# Patient Record
Sex: FEMALE | Race: WHITE
[De-identification: names, ages, dates, MRNs, and addresses within clinical notes are randomized per-mention and may not be internally consistent; named-entity substitution may affect disease eponyms.]

---

## 2017-07-04 ENCOUNTER — HOSPITAL ENCOUNTER (EMERGENCY)
Dept: HOSPITAL 56 - MW.ED | Age: 22
Discharge: HOME | End: 2017-07-04
Payer: COMMERCIAL

## 2017-07-04 VITALS — DIASTOLIC BLOOD PRESSURE: 66 MMHG | SYSTOLIC BLOOD PRESSURE: 117 MMHG

## 2017-07-04 DIAGNOSIS — F41.0: Primary | ICD-10-CM

## 2017-07-04 DIAGNOSIS — Z98.890: ICD-10-CM

## 2017-07-04 PROCEDURE — 99283 EMERGENCY DEPT VISIT LOW MDM: CPT

## 2017-07-04 NOTE — EDM.PDOC
ED HPI GENERAL MEDICAL PROBLEM





- General


Chief Complaint: Behavioral/Psych


Stated Complaint: PANIC ATTACK


Time Seen by Provider: 07/04/17 16:00


Source of Information: Reports: Patient


History Limitations: Reports: No Limitations





- History of Present Illness


INITIAL COMMENTS - FREE TEXT/NARRATIVE: 


History of present illness:


[22-year-old female presenting with complaints of anxiety. Patient has had a low

-grade anxiety problem she recalls she has struggled with since childhood. 

Patient had some incidents in the last month it has exacerbated it and now she 

is having karmen panic attacks.]





Review of systems: 


As per history of present illness and below otherwise all systems reviewed and 

negative.





Past medical history: 


As per history of present illness and as reviewed below otherwise 

noncontributory.





Surgical history: 


As per history of present illness and as reviewed below otherwise 

noncontributory.





Social history: 


No reported history of drug or alcohol abuse.





Family history: 


As per history of present illness and as reviewed below otherwise 

noncontributory.





Physical exam:


HEENT: Atraumatic, normocephalic, pupils reactive, negative for conjunctival 

pallor or scleral icterus, mucous membranes moist, throat clear, neck supple, 

nontender, trachea midline.


Lungs: Clear to auscultation, breath sounds equal bilaterally, chest nontender.


Heart: S1S2, regular, negative for clicks, rubs, or JVD.


Abdomen: Soft, nondistended, nontender. Negative for masses or 

hepatosplenomegaly. Negative for costovertebral tenderness.


Pelvis: Stable nontender.


Genitourinary: Deferred.


Rectal: Deferred.


Extremities: Atraumatic, negative for cords or calf pain. Neurovascular 

unremarkable.


Neuro: Awake, alert, oriented. Cranial nerves II through XII unremarkable. 

Cerebellum unremarkable. Motor and sensory unremarkable throughout. Exam 

nonfocal.





Global assessment is benign save the subjective complaint as noted in the 

history of present illness





Diagnostics:


[]





Therapeutics:


[]





Impression: 


[Anxiety disorder]





Plan:


[xanax for 5 days]





Definitive disposition and diagnosis as appropriate pending reevaluation and 

review of above.











- Related Data


 Allergies











Allergy/AdvReac Type Severity Reaction Status Date / Time


 


No Known Allergies Allergy   Verified 07/04/17 15:52











Home Meds: 


 Home Meds





LORazepam [Ativan] 0.5 mg PO ASDIRECTED PRN 07/04/17 [History]











Past Medical History





- Past Health History


Medical/Surgical History: Denies Medical/Surgical History





- Past Surgical History


Other HEENT Surgeries/Procedures: addenoidectomy





Social & Family History





- Tobacco Use


Smoking Status *Q: Never Smoker


Second Hand Smoke Exposure: No





- Alcohol Use


Days Per Week of Alcohol Use: 3


Number of Drinks Per Day: 3


Total Drinks Per Week: 9





- Recreational Drug Use


Recreational Drug Use: No





ED ROS GENERAL





- Review of Systems


Review Of Systems: See Below (History of present illness)





ED EXAM, BEHAVIORAL HEALTH





- Physical Exam


Exam: See Below (See history of present illness)





COURSE, BEHAVIORAL HEALTH COMP





- Course


Vital Signs: 





 Last Vital Signs











Temp  36.8 C   07/04/17 15:53


 


Pulse  102 H  07/04/17 15:53


 


Resp  20   07/04/17 15:53


 


BP  131/92 H  07/04/17 15:53


 


Pulse Ox  99   07/04/17 15:53











Orders, Labs, Meds: 





 Active Orders 24 hr











 Category Date Time Status


 


 ALPRAZolam [Xanax] Med  07/04/17 16:37 Once





 0.5 mg PO NOW ONE   














Departure





- Departure


Time of Disposition: 17:36


Disposition: Home, Self-Care 01


Condition: Good


Clinical Impression: 


 Panic disorder, Anxiety








- Discharge Information


Forms:  ED Department Discharge


Additional Instructions: 


The following information is given to patients seen in the emergency department 

who are being discharged to home. This information is to outline your options 

for follow-up care. We provide all patients seen in our emergency department 

with a follow-up referral.





The need for follow-up, as well as the timing and circumstances, are variable 

depending upon the specifics of your emergency department visit.





If you don't have a primary care physician on staff, we will provide you with a 

referral. We always advise you to contact your personal physician following an 

emergency department visit to inform them of the circumstance of the visit and 

for follow-up with them and/or the need for any referrals to a consulting 

specialist.





The emergency department will also refer you to a specialist when appropriate. 

This referral assures that you have the opportunity for follow-up care with a 

specialist. All of these measure are taken in an effort to provide you with 

optimal care, which includes your follow-up.





Under all circumstances we always encourage you to contact your private 

physician who remains a resource for coordinating your care. When calling for 

follow-up care, please make the office aware that this follow-up is from your 

recent emergency room visit. If for any reason you are refused follow-up, 

please contact the [facility] Emergency 

Department at [phone] and asked to speak to the emergency department 

charge nurse.





After a lengthy discussion about your underlying anxiety disorder, OCD and 

panic attacks I highly encourage you to follow-up with [place] [facility] as 

well as a primary care provider for prescriptions for depression/anxiety 

medication as discussed








Take all anxiety medicine sparingly as they can have a highly addictive factor








Return to ED as needed as discussed





- My Orders


Last 24 Hours: 





My Active Orders





07/04/17 16:37


ALPRAZolam [Xanax]   0.5 mg PO NOW ONE 














- Assessment/Plan


Last 24 Hours: 





My Active Orders





07/04/17 16:37


ALPRAZolam [Xanax]   0.5 mg PO NOW ONE

## 2017-07-25 ENCOUNTER — HOSPITAL ENCOUNTER (EMERGENCY)
Dept: HOSPITAL 56 - MW.ED | Age: 22
Discharge: HOME | End: 2017-07-25
Payer: COMMERCIAL

## 2017-07-25 VITALS — SYSTOLIC BLOOD PRESSURE: 130 MMHG | DIASTOLIC BLOOD PRESSURE: 73 MMHG

## 2017-07-25 DIAGNOSIS — R11.2: ICD-10-CM

## 2017-07-25 DIAGNOSIS — F41.9: Primary | ICD-10-CM

## 2017-07-25 DIAGNOSIS — Z98.890: ICD-10-CM

## 2017-07-25 DIAGNOSIS — Z79.899: ICD-10-CM

## 2017-07-25 LAB
CHLORIDE SERPL-SCNC: 113 MMOL/L (ref 98–110)
SODIUM SERPL-SCNC: 145 MMOL/L (ref 136–146)

## 2017-07-25 PROCEDURE — 96361 HYDRATE IV INFUSION ADD-ON: CPT

## 2017-07-25 PROCEDURE — 82150 ASSAY OF AMYLASE: CPT

## 2017-07-25 PROCEDURE — 85025 COMPLETE CBC W/AUTO DIFF WBC: CPT

## 2017-07-25 PROCEDURE — 80053 COMPREHEN METABOLIC PANEL: CPT

## 2017-07-25 PROCEDURE — 81025 URINE PREGNANCY TEST: CPT

## 2017-07-25 PROCEDURE — 80305 DRUG TEST PRSMV DIR OPT OBS: CPT

## 2017-07-25 PROCEDURE — 96375 TX/PRO/DX INJ NEW DRUG ADDON: CPT

## 2017-07-25 PROCEDURE — 81001 URINALYSIS AUTO W/SCOPE: CPT

## 2017-07-25 PROCEDURE — 36415 COLL VENOUS BLD VENIPUNCTURE: CPT

## 2017-07-25 PROCEDURE — 83690 ASSAY OF LIPASE: CPT

## 2017-07-25 PROCEDURE — 99284 EMERGENCY DEPT VISIT MOD MDM: CPT

## 2017-07-25 PROCEDURE — 93005 ELECTROCARDIOGRAM TRACING: CPT

## 2017-07-25 PROCEDURE — 96374 THER/PROPH/DIAG INJ IV PUSH: CPT

## 2017-07-25 NOTE — EDM.PDOC
ED HPI GENERAL MEDICAL PROBLEM





- General


Chief Complaint: General


Stated Complaint: CONSTANT VOMITING; ON SEVERAL MEDICATIONS


Time Seen by Provider: 07/25/17 01:53





- History of Present Illness


INITIAL COMMENTS - FREE TEXT/NARRATIVE: 





HISTORY AND PHYSICAL:





History of present illness:


The patient is a 22-year-old female who follows in our behavioral health clinic 

and has a long-standing history of anxiety and presents with a panic attack 

that started at approximately 11:00 in the morning and has been ongoing 

throughout the day and evening. The patient says she thinks that her panic 

attack was triggered by drinking some coffee and she tried medication she has 

at home but they did not work. She has been vomiting intermittently throughout 

the day and is now only dry heaving. She has abdominal pain and lower chest 

discomfort as a result of all the vomiting but he did not start before the 

vomiting. She denies pregnancy and has no discrete chest pain but feels short 

of breath and states she knows she is hyperventilating. Patient says that she 

has an appointment in the clinic on Wednesday to address this heightened 

anxiety but is here for treatment this evening.





Review of systems: 


As per history of present illness and below otherwise all systems reviewed and 

negative.





Past medical history: 


As per history of present illness and as reviewed below otherwise 

noncontributory.





Surgical history: 


As per history of present illness and as reviewed below otherwise 

noncontributory.





Social history: 


No reported history of drug or alcohol abuse.





Family history: 


As per history of present illness and as reviewed below otherwise 

noncontributory.





Physical exam:


Gen.: Well-nourished well-developed female who is anxious in the room 

hyperventilating and dry heaving on my evaluation. Hard for her to sit still in 

the bed for evaluation.


HEENT: Atraumatic, normocephalic, pupils reactive, negative for conjunctival 

pallor or scleral icterus, mucous membranes moist, throat clear, neck supple, 

nontender, trachea midline.


Lungs: Clear to auscultation, breath sounds equal bilaterally, chest nontender.


Heart: S1S2, regular rate and rhythm no overt murmurs


Abdomen: Soft, nondistended, nontender. NABS Negative for costovertebral 

tenderness.


Skin: No evidence of any rashes or lesions are seen grossly and turgor is 

normal there is no diaphoresis


Genitourinary: Deferred.


Rectal: Deferred.


Extremities: Atraumatic, negative for cords or calf pain. Neurovascular 

unremarkable.


Neuro: Awake, alert, oriented. Cranial nerves II through XII unremarkable. 

Cerebellum unremarkable. Motor and sensory unremarkable throughout. Exam 

nonfocal.





Diagnostics:


CBC CMP amylase lipase UA UCG EKG





Therapeutics:


IV fluids Pepcid Zofran Ativan





After  medications the patient is resting comfortable in the ED and is not 

having any dry heaves or vomiting and has been taking some ice chips. Patient 

is significantly calmer and not hyperventilating or rolling about the bed.





Patient and friend at bedside are aware of all testing results and care plan 

for discharge home. Advised to avoid caffeinated products and to keep her 

appointment in the clinic on Wednesday.


Impression: 


Anxiety/panic attack with vomiting





Definitive disposition and diagnosis as appropriate pending reevaluation and 

review of above.


  ** generalized


Pain Score (Numeric/FACES): 10





- Related Data


 Allergies











Allergy/AdvReac Type Severity Reaction Status Date / Time


 


No Known Allergies Allergy   Verified 07/25/17 01:49











Home Meds: 


 Home Meds





ALPRAZolam [Xanax] 2 mg PO 07/25/17 [History]


LORazepam 0.5 mg PO 07/25/17 [History]


Sertraline [Zoloft] 50 mg PO DAILY 07/25/17 [History]











Past Medical History





- Past Health History


Medical/Surgical History: Denies Medical/Surgical History


Cardiovascular History: Reports: None


Respiratory History: Reports: None


Gastrointestinal History: Reports: None


Genitourinary History: Reports: None


OB/GYN History: Reports: None


Musculoskeletal History: Reports: None


Neurological History: Reports: None


Psychiatric History: Reports: Anxiety, PTSD, Other (See Below)


Other Psychiatric History: conversion disorder


Endocrine/Metabolic History: Reports: None


Hematologic History: Reports: None





- Infectious Disease History


Infectious Disease History: Reports: None





- Past Surgical History


Other HEENT Surgeries/Procedures: addenoidectomy


Female  Surgical History: Reports: None





Social & Family History





- Family History


Family Medical History: Noncontributory





- Tobacco Use


Smoking Status *Q: Never Smoker


Second Hand Smoke Exposure: No





- Alcohol Use


Days Per Week of Alcohol Use: 3


Number of Drinks Per Day: 3


Total Drinks Per Week: 9





- Recreational Drug Use


Recreational Drug Use: No





ED ROS GENERAL





- Review of Systems


Review Of Systems: ROS reveals no pertinent complaints other than HPI.





ED EXAM, GENERAL





- Physical Exam


Exam: See Below (See dictation)





Course





- Vital Signs


Last Recorded V/S: 


 Last Vital Signs











Temp  36.6 C   07/25/17 01:50


 


Pulse  88   07/25/17 01:50


 


Resp  30 H  07/25/17 01:50


 


BP  130/73   07/25/17 01:50


 


Pulse Ox  100   07/25/17 01:50














- Orders/Labs/Meds


Orders: 


 Active Orders 24 hr











 Category Date Time Status


 


 EKG Documentation Completion [RC] STAT Care  07/25/17 02:12 Active


 


 Sodium Chloride 0.9% [Normal Saline] 1,000 ml Med  07/25/17 03:25 Active





 IV .Bolus   


 


 Sodium Chloride 0.9% [Saline Flush] Med  07/25/17 02:09 Active





 10 ml FLUSH ASDIRECTED PRN   


 


 Sodium Chloride 0.9% [Saline Flush] Med  07/25/17 02:09 Active





 2.5 ml FLUSH ASDIRECTED PRN   


 


 Saline Lock Insert [OM.PC] Stat Oth  07/25/17 02:09 Ordered








 Medication Orders





Sodium Chloride (Normal Saline)  1,000 mls @ 999 mls/hr IV .Bolus ONE


   Stop: 07/25/17 04:25


   Last Admin: 07/25/17 03:28  Dose: 999 mls/hr


Sodium Chloride (Saline Flush)  10 ml FLUSH ASDIRECTED PRN


   PRN Reason: Keep Vein Open


   Last Admin: 07/25/17 03:02  Dose: 10 ml


Sodium Chloride (Saline Flush)  2.5 ml FLUSH ASDIRECTED PRN


   PRN Reason: Keep Vein Open


   Last Admin: 07/25/17 03:02  Dose: 2.5 ml








Labs: 


 Laboratory Tests











  07/25/17 07/25/17 07/25/17 Range/Units





  03:15 03:15 03:35 


 


WBC  6.88    (4.0-11.0)  K/uL


 


RBC  4.23 L    (4.30-5.90)  M/uL


 


Hgb  12.8    (12.0-16.0)  g/dL


 


Hct  36.8    (36.0-46.0)  %


 


MCV  87.0    (80.0-98.0)  fL


 


MCH  30.3    (27.0-32.0)  pg


 


MCHC  34.8    (31.0-37.0)  g/dL


 


RDW Std Deviation  48.2    (28.0-62.0)  fl


 


RDW Coeff of Gali  15    (11.0-15.0)  %


 


Plt Count  252    (150-400)  K/uL


 


MPV  9.40    (7.40-12.00)  fL


 


Neut % (Auto)  88.3 H    (48.0-80.0)  %


 


Lymph % (Auto)  9.4 L    (16.0-40.0)  %


 


Mono % (Auto)  2.3    (0.0-15.0)  %


 


Eos % (Auto)  0.0    (0.0-7.0)  %


 


Baso % (Auto)  0.0    (0.0-1.5)  %


 


Neut # (Auto)  6.1 H    (1.4-5.7)  K/uL


 


Lymph # (Auto)  0.7    (0.6-2.4)  K/uL


 


Mono # (Auto)  0.2    (0.0-0.8)  K/uL


 


Eos # (Auto)  0.0    (0.0-0.7)  K/uL


 


Baso # (Auto)  0.0    (0.0-0.1)  K/uL


 


Nucleated RBC %  0.0    /100WBC


 


Nucleated RBCs #  0    K/uL


 


Sodium   145   (136-146)  mmol/L


 


Potassium   3.3 L   (3.5-5.1)  mmol/L


 


Chloride   113 H   ()  mmol/L


 


Carbon Dioxide   21   (21-31)  mmol/L


 


BUN   12   (6.0-23.0)  mg/dL


 


Creatinine   0.8   (0.6-1.5)  mg/dL


 


Est Cr Clr Drug Dosing   96.06   mL/min


 


Estimated GFR (MDRD)   > 60.0   ml/min


 


Glucose   146 H   ()  mg/dL


 


Calcium   8.4 L   (8.8-10.8)  mg/dL


 


Total Bilirubin   0.3   (0.1-1.5)  mg/dL


 


AST   19   (5-40)  IU/L


 


ALT   16   (8-54)  IU/L


 


Alkaline Phosphatase   44   ()  


 


Total Protein   6.5   (6.0-8.0)  g/dL


 


Albumin   4.0   (3.5-5.0)  g/dL


 


Globulin   2.5   (2.0-3.5)  g/dL


 


Albumin/Globulin Ratio   1.6   (1.3-2.8)  


 


Amylase   47   (10-90)  U/L


 


Lipase   < 8   (7-80)  U/L


 


Urine Color     


 


Urine Appearance     


 


Urine pH     (5.0-8.0)  


 


Ur Specific Gravity     (1.001-1.035)  


 


Urine Protein     (NEGATIVE)  mg/dL


 


Urine Glucose (UA)     (NEGATIVE)  mg/dL


 


Urine Ketones     (NEGATIVE)  mg/dL


 


Urine Occult Blood     (NEGATIVE)  


 


Urine Nitrite     (NEGATIVE)  


 


Urine Bilirubin     (NEGATIVE)  


 


Urine Ictotest     


 


Urine Urobilinogen     (<2.0)  EU/dL


 


Ur Leukocyte Esterase     (NEGATIVE)  


 


Urine RBC     (0-2/HPF)  


 


Urine WBC     (0-5/HPF)  


 


Ur Epithelial Cells     (NONE-FEW)  


 


Urine Bacteria     (NEGATIVE)  


 


Urine Mucus     (NONE-MOD)  


 


Urine HCG, Qual    NEGATIVE  (NEGATIVE)  


 


Urine Opiates Screen     (NEGATIVE)  


 


Ur Oxycodone Screen     (NEGATIVE)  


 


Urine Methadone Screen     (NEGATIVE)  


 


Ur Barbiturates Screen     (NEGATIVE)  


 


Ur Phencyclidine Scrn     (NEGATIVE)  


 


Ur Amphetamine Screen     (NEGATIVE)  


 


U Methamphetamines Scrn     (NEGATIVE)  


 


U Benzodiazepines Scrn     (NEGATIVE)  


 


U Cocaine Metab Screen     (NEGATIVE)  


 


U Marijuana (THC) Screen     (NEGATIVE)  














  07/25/17 07/25/17 Range/Units





  03:35 03:35 


 


WBC    (4.0-11.0)  K/uL


 


RBC    (4.30-5.90)  M/uL


 


Hgb    (12.0-16.0)  g/dL


 


Hct    (36.0-46.0)  %


 


MCV    (80.0-98.0)  fL


 


MCH    (27.0-32.0)  pg


 


MCHC    (31.0-37.0)  g/dL


 


RDW Std Deviation    (28.0-62.0)  fl


 


RDW Coeff of Gali    (11.0-15.0)  %


 


Plt Count    (150-400)  K/uL


 


MPV    (7.40-12.00)  fL


 


Neut % (Auto)    (48.0-80.0)  %


 


Lymph % (Auto)    (16.0-40.0)  %


 


Mono % (Auto)    (0.0-15.0)  %


 


Eos % (Auto)    (0.0-7.0)  %


 


Baso % (Auto)    (0.0-1.5)  %


 


Neut # (Auto)    (1.4-5.7)  K/uL


 


Lymph # (Auto)    (0.6-2.4)  K/uL


 


Mono # (Auto)    (0.0-0.8)  K/uL


 


Eos # (Auto)    (0.0-0.7)  K/uL


 


Baso # (Auto)    (0.0-0.1)  K/uL


 


Nucleated RBC %    /100WBC


 


Nucleated RBCs #    K/uL


 


Sodium    (136-146)  mmol/L


 


Potassium    (3.5-5.1)  mmol/L


 


Chloride    ()  mmol/L


 


Carbon Dioxide    (21-31)  mmol/L


 


BUN    (6.0-23.0)  mg/dL


 


Creatinine    (0.6-1.5)  mg/dL


 


Est Cr Clr Drug Dosing    mL/min


 


Estimated GFR (MDRD)    ml/min


 


Glucose    ()  mg/dL


 


Calcium    (8.8-10.8)  mg/dL


 


Total Bilirubin    (0.1-1.5)  mg/dL


 


AST    (5-40)  IU/L


 


ALT    (8-54)  IU/L


 


Alkaline Phosphatase    ()  


 


Total Protein    (6.0-8.0)  g/dL


 


Albumin    (3.5-5.0)  g/dL


 


Globulin    (2.0-3.5)  g/dL


 


Albumin/Globulin Ratio    (1.3-2.8)  


 


Amylase    (10-90)  U/L


 


Lipase    (7-80)  U/L


 


Urine Color  YELLOW   


 


Urine Appearance  SLT CLOUDY   


 


Urine pH  7.0   (5.0-8.0)  


 


Ur Specific Gravity  1.020   (1.001-1.035)  


 


Urine Protein  30   (NEGATIVE)  mg/dL


 


Urine Glucose (UA)  NEGATIVE   (NEGATIVE)  mg/dL


 


Urine Ketones  40 H   (NEGATIVE)  mg/dL


 


Urine Occult Blood  NEGATIVE   (NEGATIVE)  


 


Urine Nitrite  NEGATIVE   (NEGATIVE)  


 


Urine Bilirubin  SMALL H   (NEGATIVE)  


 


Urine Ictotest  NEGATIVE   


 


Urine Urobilinogen  0.2   (<2.0)  EU/dL


 


Ur Leukocyte Esterase  NEGATIVE   (NEGATIVE)  


 


Urine RBC  0-1   (0-2/HPF)  


 


Urine WBC  1-2   (0-5/HPF)  


 


Ur Epithelial Cells  MODERATE   (NONE-FEW)  


 


Urine Bacteria  FEW   (NEGATIVE)  


 


Urine Mucus  LIGHT   (NONE-MOD)  


 


Urine HCG, Qual    (NEGATIVE)  


 


Urine Opiates Screen   NEGATIVE  (NEGATIVE)  


 


Ur Oxycodone Screen   NEGATIVE  (NEGATIVE)  


 


Urine Methadone Screen   NEGATIVE  (NEGATIVE)  


 


Ur Barbiturates Screen   NEGATIVE  (NEGATIVE)  


 


Ur Phencyclidine Scrn   NEGATIVE  (NEGATIVE)  


 


Ur Amphetamine Screen   NEGATIVE  (NEGATIVE)  


 


U Methamphetamines Scrn   NEGATIVE  (NEGATIVE)  


 


U Benzodiazepines Scrn   POSITIVE  (NEGATIVE)  


 


U Cocaine Metab Screen   NEGATIVE  (NEGATIVE)  


 


U Marijuana (THC) Screen   POSITIVE  (NEGATIVE)  











Meds: 


Medications











Generic Name Dose Route Start Last Admin





  Trade Name Freq  PRN Reason Stop Dose Admin


 


Sodium Chloride  1,000 mls @ 999 mls/hr  07/25/17 03:25  07/25/17 03:28





  Normal Saline  IV  07/25/17 04:25  999 mls/hr





  .Bolus ONE   Administration


 


Sodium Chloride  10 ml  07/25/17 02:09  07/25/17 03:02





  Saline Flush  FLUSH   10 ml





  ASDIRECTED PRN   Administration





  Keep Vein Open   


 


Sodium Chloride  2.5 ml  07/25/17 02:09  07/25/17 03:02





  Saline Flush  FLUSH   2.5 ml





  ASDIRECTED PRN   Administration





  Keep Vein Open   














Discontinued Medications














Generic Name Dose Route Start Last Admin





  Trade Name Freq  PRN Reason Stop Dose Admin


 


Famotidine  20 mg  07/25/17 02:09  07/25/17 03:00





  Pepcid  IVPUSH  07/25/17 02:10  20 mg





  ONETIME ONE   Administration


 


Sodium Chloride  1,000 mls @ 999 mls/hr  07/25/17 02:09  07/25/17 03:00





  Normal Saline  IV  07/25/17 03:09  999 mls/hr





  STAT ONE   Administration


 


Lorazepam  1 mg  07/25/17 02:09  07/25/17 03:00





  Ativan  IVPUSH  07/25/17 02:10  1 mg





  ONETIME ONE   Administration


 


Ondansetron HCl  4 mg  07/25/17 02:09  07/25/17 03:00





  Zofran  IVPUSH  07/25/17 02:10  4 mg





  ONETIME ONE   Administration














Departure





- Departure


Time of Disposition: 04:04


Disposition: Home, Self-Care 01


Condition: Good


Clinical Impression: 


 Anxiety





Vomiting


Qualifiers:


 Vomiting type: unspecified Vomiting Intractability: non-intractable Nausea 

presence: with nausea Qualified Code(s): R11.2 - Nausea with vomiting, 

unspecified








- Discharge Information


Forms:  ED Department Discharge


Additional Instructions: 


The following information is given to patients seen in the emergency department 

who are being discharged to home. This information is to outline your options 

for follow-up care. We provide all patients seen in our emergency department 

with a follow-up referral.





The need for follow-up, as well as the timing and circumstances, are variable 

depending upon the specifics of your emergency department visit.





If you don't have a primary care physician on staff, we will provide you with a 

referral. We always advise you to contact your personal physician following an 

emergency department visit to inform them of the circumstance of the visit and 

for follow-up with them and/or the need for any referrals to a consulting 

specialist.





The emergency department will also refer you to a specialist when appropriate. 

This referral assures that you have the opportunity for followup care with a 

specialist. All of these measure are taken in an effort to provide you with 

optimal care, which includes your followup.





Under all circumstances we always encourage you to contact your private 

physician who remains a resource for coordinating  your care. When calling for 

followup care, please make the office aware that this follow-up is from your 

recent emergency room visit. If for any reason you are refused follow-up, 

please contact the [facility] emergency 

department at [phone] and ask to speak to the emergency department 

charge nurse.





 


Primary care- [place] [address]


[phone]








Push hydration and avoid caffeinated products as well as alcohol. Use herbal 

medications as needed and please keep your appointment in the clinic on 

Wednesday. Return to ER as needed and as discussed.





- My Orders


Last 24 Hours: 


My Active Orders





07/25/17 02:09


Sodium Chloride 0.9% [Saline Flush]   10 ml FLUSH ASDIRECTED PRN 


Sodium Chloride 0.9% [Saline Flush]   2.5 ml FLUSH ASDIRECTED PRN 


Saline Lock Insert [OM.PC] Stat 





07/25/17 02:12


EKG Documentation Completion [RC] STAT 





07/25/17 03:25


Sodium Chloride 0.9% [Normal Saline] 1,000 ml IV .Bolus 














- Assessment/Plan


Last 24 Hours: 


My Active Orders





07/25/17 02:09


Sodium Chloride 0.9% [Saline Flush]   10 ml FLUSH ASDIRECTED PRN 


Sodium Chloride 0.9% [Saline Flush]   2.5 ml FLUSH ASDIRECTED PRN 


Saline Lock Insert [OM.PC] Stat 





07/25/17 02:12


EKG Documentation Completion [RC] STAT 





07/25/17 03:25


Sodium Chloride 0.9% [Normal Saline] 1,000 ml IV .Bolus

## 2017-07-26 ENCOUNTER — HOSPITAL ENCOUNTER (EMERGENCY)
Dept: HOSPITAL 56 - MW.ED | Age: 22
Discharge: HOME | End: 2017-07-26
Payer: COMMERCIAL

## 2017-07-26 VITALS — SYSTOLIC BLOOD PRESSURE: 147 MMHG | DIASTOLIC BLOOD PRESSURE: 101 MMHG

## 2017-07-26 DIAGNOSIS — R11.10: ICD-10-CM

## 2017-07-26 DIAGNOSIS — F41.9: ICD-10-CM

## 2017-07-26 DIAGNOSIS — R10.9: Primary | ICD-10-CM

## 2017-07-26 DIAGNOSIS — Z79.899: ICD-10-CM

## 2017-07-26 LAB
CHLORIDE SERPL-SCNC: 105 MMOL/L (ref 98–110)
SODIUM SERPL-SCNC: 144 MMOL/L (ref 136–146)

## 2017-07-26 PROCEDURE — 96374 THER/PROPH/DIAG INJ IV PUSH: CPT

## 2017-07-26 PROCEDURE — 96372 THER/PROPH/DIAG INJ SC/IM: CPT

## 2017-07-26 PROCEDURE — 99284 EMERGENCY DEPT VISIT MOD MDM: CPT

## 2017-07-26 PROCEDURE — C9113 INJ PANTOPRAZOLE SODIUM, VIA: HCPCS

## 2017-07-26 PROCEDURE — 36415 COLL VENOUS BLD VENIPUNCTURE: CPT

## 2017-07-26 PROCEDURE — 96361 HYDRATE IV INFUSION ADD-ON: CPT

## 2017-07-26 PROCEDURE — 76705 ECHO EXAM OF ABDOMEN: CPT

## 2017-07-26 PROCEDURE — 85025 COMPLETE CBC W/AUTO DIFF WBC: CPT

## 2017-07-26 PROCEDURE — 96375 TX/PRO/DX INJ NEW DRUG ADDON: CPT

## 2017-07-26 PROCEDURE — 80053 COMPREHEN METABOLIC PANEL: CPT

## 2017-07-26 NOTE — US
EXAMINATION: Right upper quadrant ultrasound

 

HISTORY: Pain

 

COMPARISON: 9/17/2015

 

TECHNIQUE: Grayscale and color Doppler images obtained of the right upper quadrant.

 

FINDINGS: The visualized pancreas appears normal. The liver is normal in contour and echogenicity wi
thout a focal hepatic mass. The gallbladder wall thickness is normal. No pericholecystic fluid or sh
adowing gallstones. The common bile duct measures 3 mm. The right kidney measures 10.3 cm pole-to-po
le without evidence of hydronephrosis. The sonographic sign is negative.

 

IMPRESSION: Unremarkable right upper quadrant ultrasound.

## 2017-07-26 NOTE — EDM.PDOC
ED HPI GENERAL MEDICAL PROBLEM





- General


Stated Complaint: VOMITING


Time Seen by Provider: 07/26/17 07:50


Source of Information: Reports: Patient





- History of Present Illness


INITIAL COMMENTS - FREE TEXT/NARRATIVE: 





HISTORY AND PHYSICAL:


History of present illness:


[]Patient has right upper quadrant pain 8 out of 10 in intractable vomiting she 

now has burning pain in the epigastrium secondary to vomiting, she is also 

quite anxious pain me came present before the anxiety today





No fever chills sweats no chest pain shortness of breath headache dizziness or 

palpitation





Patient has had multiple episodes recurrent over the last month or 2 with the 

similar symptoms, she is unable to keep any food down including periods








Review of systems: 


As per history of present illness and below otherwise all systems reviewed and 

negative.


Past medical history: 


As per history of present illness and as reviewed below otherwise 

noncontributory.


Surgical history: 


As per history of present illness and as reviewed below otherwise 

noncontributory.


Social history: 


No reported history of drug or alcohol abuse.


Family history: 


As per history of present illness and as reviewed below otherwise 

noncontributory.


Physical exam:


HEENT: Atraumatic, normocephalic, pupils reactive, negative for conjunctival 

pallor or scleral icterus, mucous membranes moist, throat clear, neck supple, 

nontender, trachea midline.


Lungs: Clear to auscultation, breath sounds equal bilaterally, chest nontender.


Heart: S1S2, regular, negative for clicks, rubs, or JVD.


Abdomen: Soft, nondistended, nontender in lower quadrants. Tender in right 

upper quadrant. Negative for masses or hepatosplenomegaly. Negative for 

costovertebral tenderness.


Pelvis: Stable nontender.


Genitourinary: Deferred.


Rectal: Deferred.


Extremities: Atraumatic, negative for cords or calf pain. Neurovascular 

unremarkable.


Neuro: Awake, alert, oriented. Cranial nerves II through XII unremarkable. 

Cerebellum unremarkable. Motor and sensory unremarkable throughout. Exam 

nonfocal.


Diagnostics:


[]CBC, CMP, UA


Ultrasound limited right upper quadrant





Patient has an appointment with her primary care to explore anxiety treatment 

however the right upper quadrant pain it may be worth performing a HIDA scan to 

rule out gallbladder pain increasing anxiety





Therapeutics:


[]1 L normal saline bolus


Zofran 8 mg IV


Protonix 80 mg IV


Toradol 30 mg IV

















Impression: 


[]Vomiting


Right upper quadrant pain


Anxiety


Definitive disposition and diagnosis as appropriate pending reevaluation and 

review of above.


  ** Abdominal


Pain Score (Numeric/FACES): 10





- Related Data


 Allergies











Allergy/AdvReac Type Severity Reaction Status Date / Time


 


No Known Allergies Allergy   Verified 07/26/17 07:54











Home Meds: 


 Home Meds





ALPRAZolam [Xanax] 2 mg PO DAILY 07/25/17 [History]


LORazepam 0.5 mg PO DAILY 07/25/17 [History]


Sertraline [Zoloft] 50 mg PO DAILY 07/25/17 [History]











Past Medical History





- Past Health History


Medical/Surgical History: Denies Medical/Surgical History


Cardiovascular History: Reports: None


Respiratory History: Reports: None


Gastrointestinal History: Reports: None


Genitourinary History: Reports: None


OB/GYN History: Reports: None


Musculoskeletal History: Reports: None


Neurological History: Reports: None


Psychiatric History: Reports: Anxiety, PTSD, Other (See Below)


Other Psychiatric History: conversion disorder


Endocrine/Metabolic History: Reports: None


Hematologic History: Reports: None





- Infectious Disease History


Infectious Disease History: Reports: None





- Past Surgical History


Other HEENT Surgeries/Procedures: addenoidectomy


Female  Surgical History: Reports: None





Social & Family History





- Family History


Family Medical History: Noncontributory





- Tobacco Use


Smoking Status *Q: Never Smoker


Second Hand Smoke Exposure: No





- Alcohol Use


Days Per Week of Alcohol Use: 3


Number of Drinks Per Day: 3


Total Drinks Per Week: 9





- Recreational Drug Use


Recreational Drug Use: No





ED ROS GENERAL





- Review of Systems


Review Of Systems: ROS reveals no pertinent complaints other than HPI.





ED EXAM, GENERAL





- Physical Exam


Exam: See Below





Course





- Vital Signs


Last Recorded V/S: 


 Last Vital Signs











Temp  36.3 C   07/26/17 07:52


 


Pulse  68   07/26/17 07:52


 


Resp  22 H  07/26/17 07:52


 


BP  136/72   07/26/17 07:52


 


Pulse Ox  99   07/26/17 07:52














- Orders/Labs/Meds


Orders: 


 Active Orders 24 hr











 Category Date Time Status


 


 Abdomen Ltd [US] Stat Exams  07/26/17 08:20 Taken


 


 HCG QUALITATIVE,URINE [URCHEM] Stat Lab  07/26/17 07:50 Uncollected


 


 UA W/MICROSCOPIC [URIN] Stat Lab  07/26/17 07:50 Uncollected











Labs: 


 Laboratory Tests











  07/26/17 07/26/17 Range/Units





  07:53 07:53 


 


WBC  9.00   (4.0-11.0)  K/uL


 


RBC  5.11   (4.30-5.90)  M/uL


 


Hgb  15.4   (12.0-16.0)  g/dL


 


Hct  44.9   (36.0-46.0)  %


 


MCV  87.9   (80.0-98.0)  fL


 


MCH  30.1   (27.0-32.0)  pg


 


MCHC  34.3   (31.0-37.0)  g/dL


 


RDW Std Deviation  49.2   (28.0-62.0)  fl


 


RDW Coeff of Gali  15   (11.0-15.0)  %


 


Plt Count  312   (150-400)  K/uL


 


MPV  9.60   (7.40-12.00)  fL


 


Neut % (Auto)  74.4   (48.0-80.0)  %


 


Lymph % (Auto)  19.6   (16.0-40.0)  %


 


Mono % (Auto)  5.9   (0.0-15.0)  %


 


Eos % (Auto)  0.0   (0.0-7.0)  %


 


Baso % (Auto)  0.1   (0.0-1.5)  %


 


Neut # (Auto)  6.7 H   (1.4-5.7)  K/uL


 


Lymph # (Auto)  1.8   (0.6-2.4)  K/uL


 


Mono # (Auto)  0.5   (0.0-0.8)  K/uL


 


Eos # (Auto)  0.0   (0.0-0.7)  K/uL


 


Baso # (Auto)  0.0   (0.0-0.1)  K/uL


 


Nucleated RBC %  0.0   /100WBC


 


Nucleated RBCs #  0   K/uL


 


Sodium   144  (136-146)  mmol/L


 


Potassium   3.1 L  (3.5-5.1)  mmol/L


 


Chloride   105  ()  mmol/L


 


Carbon Dioxide   23  (21-31)  mmol/L


 


BUN   9  (6.0-23.0)  mg/dL


 


Creatinine   1.0  (0.6-1.5)  mg/dL


 


Est Cr Clr Drug Dosing   76.85  mL/min


 


Estimated GFR (MDRD)   > 60.0  ml/min


 


Glucose   109  ()  mg/dL


 


Calcium   9.7  (8.8-10.8)  mg/dL


 


Total Bilirubin   0.4  (0.1-1.5)  mg/dL


 


AST   21  (5-40)  IU/L


 


ALT   18  (8-54)  IU/L


 


Alkaline Phosphatase   53  ()  


 


Total Protein   7.8  (6.0-8.0)  g/dL


 


Albumin   4.7  (3.5-5.0)  g/dL


 


Globulin   3.1  (2.0-3.5)  g/dL


 


Albumin/Globulin Ratio   1.5  (1.3-2.8)  











Meds: 


Medications














Discontinued Medications














Generic Name Dose Route Start Last Admin





  Trade Name Freq  PRN Reason Stop Dose Admin


 


Diazepam  5 mg  07/26/17 08:42  07/26/17 08:45





  Valium  IVPUSH  07/26/17 08:43  5 mg





  ONETIME ONE   Administration


 


Sodium Chloride  1,000 mls @ 999 mls/hr  07/26/17 07:50  07/26/17 07:57





  Normal Saline  IV  07/26/17 08:50  999 mls/hr





  STAT ONE   Administration


 


Ketorolac Tromethamine  30 mg  07/26/17 08:20  07/26/17 08:26





  Toradol  IVPUSH  07/26/17 08:21  30 mg





  ONETIME ONE   Administration


 


Lorazepam  1 mg  07/26/17 08:15  07/26/17 08:25





  Ativan  IVPUSH  07/26/17 08:16  1 mg





  ONETIME ONE   Administration


 


Ondansetron HCl  8 mg  07/26/17 07:50  07/26/17 07:57





  Zofran  IVPUSH  07/26/17 07:51  8 mg





  ONETIME ONE   Administration


 


Pantoprazole Sodium  80 mg  07/26/17 08:15  07/26/17 08:25





  Protonix Iv***  IVPUSH  07/26/17 08:16  80 mg





  .BOLUS ONE   Administration


 


Promethazine HCl  25 mg  07/26/17 09:11  07/26/17 09:18





  Phenergan  IM  07/26/17 09:12  25 mg





  ONETIME ONE   Administration














Departure





- Departure


Time of Disposition: 09:25


Disposition: Home, Self-Care 01


Condition: Good


Clinical Impression: 


 Abdominal pain, Anxiety








- Discharge Information


Additional Instructions: 


Strongly recommend HIDA scan to rule out gallbladder involvement


Avoid greasy food as discussed


Follow-up with primary care 1:00 as scheduled and consider scheduling HIDA scan





The following information is given to patients seen in the emergency department 

who are being discharged to home. This information is to outline your options 

for follow-up care. We provide all patients seen in our emergency department 

with a follow-up referral.





The need for follow-up, as well as the timing and circumstances, are variable 

depending upon the specifics of your emergency department visit.





If you don't have a primary care physician on staff, we will provide you with a 

referral. We always advise you to contact your personal physician following an 

emergency department visit to inform them of the circumstance of the visit and 

for follow-up with them and/or the need for any referrals to a consulting 

specialist.





The emergency department will also refer you to a specialist when appropriate. 

This referral assures that you have the opportunity for follow-up care with a 

specialist. All of these measure are taken in an effort to provide you with 

optimal care, which includes your follow-up.





Under all circumstances we always encourage you to contact your private 

physician who remains a resource for coordinating your care. When calling for 

follow-up care, please make the office aware that this follow-up is from your 

recent emergency room visit. If for any reason you are refused follow-up, 

please contact the [facility] emergency department at [phone] 

and asked to speak to the emergency department charge nurse.








- My Orders


Last 24 Hours: 


My Active Orders





07/26/17 07:50


HCG QUALITATIVE,URINE [URCHEM] Stat 


UA W/MICROSCOPIC [URIN] Stat 





07/26/17 08:20


Abdomen Ltd [US] Stat 














- Assessment/Plan


Last 24 Hours: 


My Active Orders





07/26/17 07:50


HCG QUALITATIVE,URINE [URCHEM] Stat 


UA W/MICROSCOPIC [URIN] Stat 





07/26/17 08:20


Abdomen Ltd [US] Stat

## 2017-08-27 ENCOUNTER — HOSPITAL ENCOUNTER (EMERGENCY)
Dept: HOSPITAL 56 - MW.ED | Age: 22
Discharge: HOME | End: 2017-08-27
Payer: COMMERCIAL

## 2017-08-27 VITALS — SYSTOLIC BLOOD PRESSURE: 111 MMHG | DIASTOLIC BLOOD PRESSURE: 62 MMHG

## 2017-08-27 DIAGNOSIS — R10.11: Primary | ICD-10-CM

## 2017-08-27 DIAGNOSIS — Z98.890: ICD-10-CM

## 2017-08-27 LAB
CHLORIDE SERPL-SCNC: 110 MMOL/L (ref 98–110)
SODIUM SERPL-SCNC: 140 MMOL/L (ref 136–146)

## 2017-08-27 PROCEDURE — 99284 EMERGENCY DEPT VISIT MOD MDM: CPT

## 2017-08-27 PROCEDURE — 82150 ASSAY OF AMYLASE: CPT

## 2017-08-27 PROCEDURE — 85025 COMPLETE CBC W/AUTO DIFF WBC: CPT

## 2017-08-27 PROCEDURE — 96375 TX/PRO/DX INJ NEW DRUG ADDON: CPT

## 2017-08-27 PROCEDURE — 81025 URINE PREGNANCY TEST: CPT

## 2017-08-27 PROCEDURE — 76705 ECHO EXAM OF ABDOMEN: CPT

## 2017-08-27 PROCEDURE — 83690 ASSAY OF LIPASE: CPT

## 2017-08-27 PROCEDURE — 80053 COMPREHEN METABOLIC PANEL: CPT

## 2017-08-27 PROCEDURE — 81001 URINALYSIS AUTO W/SCOPE: CPT

## 2017-08-27 PROCEDURE — C9113 INJ PANTOPRAZOLE SODIUM, VIA: HCPCS

## 2017-08-27 PROCEDURE — 96361 HYDRATE IV INFUSION ADD-ON: CPT

## 2017-08-27 PROCEDURE — 96374 THER/PROPH/DIAG INJ IV PUSH: CPT

## 2017-08-27 RX ADMIN — SODIUM CHLORIDE ONE: 9 INJECTION, SOLUTION INTRAMUSCULAR; INTRAVENOUS; SUBCUTANEOUS at 11:56

## 2017-08-27 RX ADMIN — SODIUM CHLORIDE ONE: 9 INJECTION, SOLUTION INTRAMUSCULAR; INTRAVENOUS; SUBCUTANEOUS at 12:02

## 2017-08-27 NOTE — EDM.PDOC
ED HPI GENERAL MEDICAL PROBLEM





- General


Chief Complaint: Abdominal Pain


Stated Complaint: ABD PAIN


Time Seen by Provider: 08/27/17 11:15


Source of Information: Reports: Patient


History Limitations: Reports: No Limitations





- History of Present Illness


INITIAL COMMENTS - FREE TEXT/NARRATIVE: 





HISTORY AND PHYSICAL:





History of present illness:


[Patient comes emergency room complaining of right upper quadrant abdominal 

pain and worsened anxiety. She's been experiencing right upper quadrant pain on 

and off for the past several months. She's been seen in the emergency room 

several times for this over the past couple of months and has followed with 

Giovanna Logan. She had a HIDA scan one month ago that was unremarkable.


She complains of continued pain today which is causing her to feel nauseous. 

She had one episode of vomiting today and several yesterday. Abdominal pain has 

been present on and off for the past several months, it has not worsened in 

intensity or frequency. She is frustrated that the cause of her pain has not 

been identified. Has a long history of anxiety but has been unable to get any 

of her medications filled due to cost and insurance issues.]





Review of systems: 


As per history of present illness and below otherwise all systems reviewed and 

negative.





Past medical history: 


As per history of present illness and as reviewed below otherwise 

noncontributory.





Surgical history: 


As per history of present illness and as reviewed below otherwise 

noncontributory.





Social history: 


No reported history of drug or alcohol abuse.





Family history: 


As per history of present illness and as reviewed below otherwise 

noncontributory.





Physical exam:


HEENT: Atraumatic, normocephalic. Oral mucous membranes are pink and dry. 

Throat is clear. Neck is supple and without lymphadenopathy.


Lungs: Clear to auscultation, breath sounds equal bilaterally, chest nontender.


Heart: S1S2, regular rate and rhythm. No murmur gallop click or rub.


Abdomen: Bowel sounds are present in all 4 quadrants. Abdomen is soft and 

nondistended. She is tender throughout but primarily over the right upper 

quadrant. She is guarded to palpation in this area. No rebound or masses are 

appreciated. No hepatomegaly. Negative for costovertebral tenderness.


Pelvis: Stable nontender.


Genitourinary: Deferred.


Rectal: Deferred.


Extremities: Atraumatic, negative for cords or calf pain. No cyanosis or edema. 

Neurovascular unremarkable.


Neuro: Awake, alert, oriented. Motor and sensory unremarkable throughout. Exam 

nonfocal.


Psych: Appears mildly anxious, with increased respiratory rate and fast speech. 

Tearful at times.





Diagnostics:


[CBC, CMP, lipase, amylase, UA, urine pregnancy, abdominal ultrasound]





Therapeutics:


[1 L normal saline, Zofran 4 mg IV, Toradol milligrams IV, Protonix 80 mg IV, 

GI cocktail]





Impression: 


[abdominal pain]





Plan:


[Discussed with patient that her lab results are within normal limits. 

Abdominal ultrasound shows normal right upper quadrant, normal liver and normal 

gallbladder normal pancreas. Discussed with patient the need to follow-up with 

her primary care and schedule EGD. She is given a prescription for Protonix 40 

mg #30 sig 1. Daily 0 refills. Patient is in agreement with today's plan. All 

questions are answered and concerns are addressed.]





Definitive disposition and diagnosis as appropriate pending reevaluation and 

review of above.





  ** Right Upper Abdomen


Pain Score (Numeric/FACES): 7





- Related Data


 Allergies











Allergy/AdvReac Type Severity Reaction Status Date / Time


 


No Known Allergies Allergy   Verified 08/27/17 11:23











Home Meds: 


 Home Meds





. [No Known Home Meds]  08/27/17 [History]











Past Medical History





- Past Health History


Medical/Surgical History: Denies Medical/Surgical History


HEENT History: Reports: None


Cardiovascular History: Reports: None


Respiratory History: Reports: None


Gastrointestinal History: Reports: None


Genitourinary History: Reports: None


OB/GYN History: Reports: None


Musculoskeletal History: Reports: None


Neurological History: Reports: None


Psychiatric History: Reports: Anxiety, PTSD, Other (See Below)


Other Psychiatric History: conversion disorder


Endocrine/Metabolic History: Reports: None


Hematologic History: Reports: None





- Infectious Disease History


Infectious Disease History: Reports: None





- Past Surgical History


HEENT Surgical History: Reports: Adenoidectomy, Tonsillectomy


Female  Surgical History: Reports: None





Social & Family History





- Family History


Family Medical History: Noncontributory





- Tobacco Use


Smoking Status *Q: Never Smoker


Second Hand Smoke Exposure: No





- Caffeine Use


Caffeine Use: Reports: None





- Alcohol Use


Days Per Week of Alcohol Use: 3


Number of Drinks Per Day: 3


Total Drinks Per Week: 9





- Recreational Drug Use


Recreational Drug Use: No





ED ROS GENERAL





- Review of Systems


Review Of Systems: ROS reveals no pertinent complaints other than HPI.





ED EXAM, GI/ABD





- Physical Exam


Exam: See Below





Course





- Vital Signs


Last Recorded V/S: 


 Last Vital Signs











Temp  97.9 F   08/27/17 11:17


 


Pulse  89   08/27/17 11:17


 


Resp  20   08/27/17 11:17


 


BP  114/58 L  08/27/17 11:17


 


Pulse Ox  97   08/27/17 11:17














- Orders/Labs/Meds


Orders: 


 Active Orders 24 hr











 Category Date Time Status


 


 Abdomen Ltd [US] Stat Exams  08/27/17 11:59 Taken


 


 GI Cocktail with Reglan 25 ML PO x 1 Med  08/27/17 13:38 Ordered





 Alum Hydrox/Mag Hydrox/Simeth [Mag-Al Plus] 15 ml   





 Metoclopramide [Reglan] 5 mg   





 Lidocaine 2% [Xylocaine 2% Viscous] 5 ml   





 PO ONETIME   











Labs: 


 Laboratory Tests











  08/27/17 08/27/17 08/27/17 Range/Units





  11:30 11:30 11:41 


 


WBC    5.61  (4.0-11.0)  K/uL


 


RBC    4.58  (4.30-5.90)  M/uL


 


Hgb    14.1  (12.0-16.0)  g/dL


 


Hct    39.7  (36.0-46.0)  %


 


MCV    86.7  (80.0-98.0)  fL


 


MCH    30.8  (27.0-32.0)  pg


 


MCHC    35.5  (31.0-37.0)  g/dL


 


RDW Std Deviation    45.0  (28.0-62.0)  fl


 


RDW Coeff of Gali    14  (11.0-15.0)  %


 


Plt Count    297  (150-400)  K/uL


 


MPV    9.30  (7.40-12.00)  fL


 


Neut % (Auto)    58.0  (48.0-80.0)  %


 


Lymph % (Auto)    33.9  (16.0-40.0)  %


 


Mono % (Auto)    6.1  (0.0-15.0)  %


 


Eos % (Auto)    1.6  (0.0-7.0)  %


 


Baso % (Auto)    0.4  (0.0-1.5)  %


 


Neut # (Auto)    3.3  (1.4-5.7)  K/uL


 


Lymph # (Auto)    1.9  (0.6-2.4)  K/uL


 


Mono # (Auto)    0.3  (0.0-0.8)  K/uL


 


Eos # (Auto)    0.1  (0.0-0.7)  K/uL


 


Baso # (Auto)    0.0  (0.0-0.1)  K/uL


 


Nucleated RBC %    0.0  /100WBC


 


Nucleated RBCs #    0  K/uL


 


Sodium     (136-146)  mmol/L


 


Potassium     (3.5-5.1)  mmol/L


 


Chloride     ()  mmol/L


 


Carbon Dioxide     (21-31)  mmol/L


 


BUN     (6.0-23.0)  mg/dL


 


Creatinine     (0.6-1.5)  mg/dL


 


Est Cr Clr Drug Dosing     mL/min


 


Estimated GFR (MDRD)     ml/min


 


Glucose     ()  mg/dL


 


Calcium     (8.8-10.8)  mg/dL


 


Total Bilirubin     (0.1-1.5)  mg/dL


 


AST     (5-40)  IU/L


 


ALT     (8-54)  IU/L


 


Alkaline Phosphatase     ()  


 


Total Protein     (6.0-8.0)  g/dL


 


Albumin     (3.5-5.0)  g/dL


 


Globulin     (2.0-3.5)  g/dL


 


Albumin/Globulin Ratio     (1.3-2.8)  


 


Amylase     (10-90)  U/L


 


Lipase     (7-80)  U/L


 


Urine Color   YELLOW   


 


Urine Appearance   CLEAR   


 


Urine pH   8.5 H   (5.0-8.0)  


 


Ur Specific Gravity   1.010   (1.001-1.035)  


 


Urine Protein   NEGATIVE   (NEGATIVE)  mg/dL


 


Urine Glucose (UA)   NEGATIVE   (NEGATIVE)  mg/dL


 


Urine Ketones   NEGATIVE   (NEGATIVE)  mg/dL


 


Urine Occult Blood   NEGATIVE   (NEGATIVE)  


 


Urine Nitrite   NEGATIVE   (NEGATIVE)  


 


Urine Bilirubin   NEGATIVE   (NEGATIVE)  


 


Urine Urobilinogen   0.2   (<2.0)  EU/dL


 


Ur Leukocyte Esterase   NEGATIVE   (NEGATIVE)  


 


Urine RBC   0-1   (0-2/HPF)  


 


Urine WBC   0-1   (0-5/HPF)  


 


Ur Epithelial Cells   RARE   (NONE-FEW)  


 


Urine Bacteria   RARE   (NEGATIVE)  


 


Urine HCG, Qual  NEGATIVE    (NEGATIVE)  














  08/27/17 Range/Units





  11:41 


 


WBC   (4.0-11.0)  K/uL


 


RBC   (4.30-5.90)  M/uL


 


Hgb   (12.0-16.0)  g/dL


 


Hct   (36.0-46.0)  %


 


MCV   (80.0-98.0)  fL


 


MCH   (27.0-32.0)  pg


 


MCHC   (31.0-37.0)  g/dL


 


RDW Std Deviation   (28.0-62.0)  fl


 


RDW Coeff of Gali   (11.0-15.0)  %


 


Plt Count   (150-400)  K/uL


 


MPV   (7.40-12.00)  fL


 


Neut % (Auto)   (48.0-80.0)  %


 


Lymph % (Auto)   (16.0-40.0)  %


 


Mono % (Auto)   (0.0-15.0)  %


 


Eos % (Auto)   (0.0-7.0)  %


 


Baso % (Auto)   (0.0-1.5)  %


 


Neut # (Auto)   (1.4-5.7)  K/uL


 


Lymph # (Auto)   (0.6-2.4)  K/uL


 


Mono # (Auto)   (0.0-0.8)  K/uL


 


Eos # (Auto)   (0.0-0.7)  K/uL


 


Baso # (Auto)   (0.0-0.1)  K/uL


 


Nucleated RBC %   /100WBC


 


Nucleated RBCs #   K/uL


 


Sodium  140  (136-146)  mmol/L


 


Potassium  3.6  (3.5-5.1)  mmol/L


 


Chloride  110  ()  mmol/L


 


Carbon Dioxide  18 L  (21-31)  mmol/L


 


BUN  8  (6.0-23.0)  mg/dL


 


Creatinine  0.8  (0.6-1.5)  mg/dL


 


Est Cr Clr Drug Dosing  96.06  mL/min


 


Estimated GFR (MDRD)  > 60.0  ml/min


 


Glucose  98  ()  mg/dL


 


Calcium  9.6  (8.8-10.8)  mg/dL


 


Total Bilirubin  0.5  (0.1-1.5)  mg/dL


 


AST  19  (5-40)  IU/L


 


ALT  14  (8-54)  IU/L


 


Alkaline Phosphatase  53  ()  


 


Total Protein  7.1  (6.0-8.0)  g/dL


 


Albumin  4.2  (3.5-5.0)  g/dL


 


Globulin  2.9  (2.0-3.5)  g/dL


 


Albumin/Globulin Ratio  1.5  (1.3-2.8)  


 


Amylase  73  (10-90)  U/L


 


Lipase  12  (7-80)  U/L


 


Urine Color   


 


Urine Appearance   


 


Urine pH   (5.0-8.0)  


 


Ur Specific Gravity   (1.001-1.035)  


 


Urine Protein   (NEGATIVE)  mg/dL


 


Urine Glucose (UA)   (NEGATIVE)  mg/dL


 


Urine Ketones   (NEGATIVE)  mg/dL


 


Urine Occult Blood   (NEGATIVE)  


 


Urine Nitrite   (NEGATIVE)  


 


Urine Bilirubin   (NEGATIVE)  


 


Urine Urobilinogen   (<2.0)  EU/dL


 


Ur Leukocyte Esterase   (NEGATIVE)  


 


Urine RBC   (0-2/HPF)  


 


Urine WBC   (0-5/HPF)  


 


Ur Epithelial Cells   (NONE-FEW)  


 


Urine Bacteria   (NEGATIVE)  


 


Urine HCG, Qual   (NEGATIVE)  











Meds: 


Medications














Discontinued Medications














Generic Name Dose Route Start Last Admin





  Trade Name Freq  PRN Reason Stop Dose Admin


 


Sodium Chloride  1,000 mls @ 999 mls/hr  08/27/17 11:23  08/27/17 11:40





  Normal Saline  IV  08/27/17 12:23  999 mls/hr





  STAT ONE   Administration


 


Pantoprazole Sodium 80 mg/  100 mls @ 10 mls/hr  08/27/17 11:35  08/27/17 11:56





  Sodium Chloride  IV  08/27/17 21:34  Not Given





  ONETIME ONE   


 


Pantoprazole Sodium 40 mg/  10 mls @ 300 mls/hr  08/27/17 11:52  08/27/17 12:02





  Sodium Chloride  IVPUSH  08/27/17 11:53  Not Given





  NOW ONE   


 


Ketorolac Tromethamine  30 mg  08/27/17 11:35  08/27/17 11:46





  Toradol  IVPUSH  08/27/17 11:36  30 mg





  ONETIME ONE   Administration


 


Ondansetron HCl  4 mg  08/27/17 11:23  08/27/17 11:40





  Zofran  IVPUSH  08/27/17 11:24  4 mg





  ONETIME ONE   Administration


 


Pantoprazole Sodium  Confirm  08/27/17 11:45  08/27/17 11:55





  Protonix Iv***  Administered  08/27/17 11:46  80 mg





  Dose   Administration





  80 mg   





  .ROUTE   





  .STK-MED ONE   














Departure





- Departure


Time of Disposition: 13:50


Disposition: Home, Self-Care 01


Condition: Good


Clinical Impression: 


 Abdominal pain








- Discharge Information


Referrals: 


Norby,Elisha A, NP [Primary Care Provider] - 


Forms:  ED Department Discharge


Additional Instructions: 


The following information is given to patients seen in the emergency department 

who are being discharged to home. This information is to outline your options 

for follow-up care. We provide all patients seen in our emergency department 

with a follow-up referral.





The need for follow-up, as well as the timing and circumstances, are variable 

depending upon the specifics of your emergency department visit.





If you don't have a primary care physician on staff, we will provide you with a 

referral. We always advise you to contact your personal physician following an 

emergency department visit to inform them of the circumstance of the visit and 

for follow-up with them and/or the need for any referrals to a consulting 

specialist.





The emergency department will also refer you to a specialist when appropriate. 

This referral assures that you have the opportunity for follow-up care with a 

specialist. All of these measure are taken in an effort to provide you with 

optimal care, which includes your follow-up.





Under all circumstances we always encourage you to contact your private 

physician who remains a resource for coordinating your care. When calling for 

follow-up care, please make the office aware that this follow-up is from your 

recent emergency room visit. If for any reason you are refused follow-up, 

please contact the   emergency 

department at [phone] and asked to speak to the emergency department 

charge nurse.








Primary Care


[address] [place] [id number]


Phone: [phone]


Fax: [phone]





Follow-up with your primary care provider at the clinic listed above in 48-72 

hours.


Take Protonix 1 tablet daily. Avoid greasy fatty foods, avoid spicy acidic 

foods.


Return to ER as needed as discussed.





- My Orders


Last 24 Hours: 


My Active Orders





08/27/17 11:59


Abdomen Ltd [[place]] Stat 





08/27/17 13:38


GI Cocktail with Reglan 25 ML PO x 1 Alum Hydrox/Mag Hydrox/Simeth [Mag-Al Plus

] 15 ml Metoclopramide [Reglan] 5 mg Lidocaine 2% [Xylocaine 2% Viscous] 5 ml 

PO ONETIME 














- Assessment/Plan


Last 24 Hours: 


My Active Orders





08/27/17 11:59


Abdomen Ltd [[place]] Stat 





08/27/17 13:38


GI Cocktail with Reglan 25 ML PO x 1 Alum Hydrox/Mag Hydrox/Simeth [Mag-Al Plus

] 15 ml Metoclopramide [Reglan] 5 mg Lidocaine 2% [Xylocaine 2% Viscous] 5 ml 

PO ONETIME

## 2017-08-28 NOTE — US
EXAM DATE: 17



PATIENT'S AGE: 22





Patient: CINDY VELOZ



Facility: [place], [place]

Patient ID: [id number]

Site Patient ID: [id number].

Site Accession #: [id number].

: 1995

Study: US Abdomen [id number]/27/2017 12:42:19 PM

Ordering Physician: Doctor Temp



Final Report: 

HISTORY:

Upper quadrant pain, nausea.



Technique:

Ultrasound of the right upper quadrant.



Comparison:

Right upper quadrant ultrasound 2017, biliary scintigraphy 2017.



Findings:

Liver has normal contour and echogenicity. No liver lesions.



No intrahepatic bile duct dilation. No cholelithiasis, gallbladder wall 
thickening, or pericholecystic fluid. No sonographic Baxter sign. Common duct 
measures 4 mm in caliber, within normal limits.



Visualized portions of the head and body of the pancreas are unremarkable.



Right kidney measures 10.5 cm in long axis. Normal parenchymal thickness and 
echogenicity. No hydronephrosis. 



Impression:

Normal right upper quadrant ultrasound.





Dictated by Vasquez Conway MD @ Aug 27 2017 1:26PM

(Electronic Signature)



Report Signed by Proxy.
[facility]

## 2017-08-30 ENCOUNTER — HOSPITAL ENCOUNTER (OUTPATIENT)
Dept: HOSPITAL 56 - MW.SDS | Age: 22
Setting detail: OBSERVATION
LOS: 1 days | Discharge: HOME | End: 2017-08-31
Attending: SURGERY | Admitting: SURGERY
Payer: COMMERCIAL

## 2017-08-30 DIAGNOSIS — R10.11: ICD-10-CM

## 2017-08-30 DIAGNOSIS — Z88.8: ICD-10-CM

## 2017-08-30 DIAGNOSIS — F32.9: ICD-10-CM

## 2017-08-30 DIAGNOSIS — Z90.89: ICD-10-CM

## 2017-08-30 DIAGNOSIS — Z79.899: ICD-10-CM

## 2017-08-30 DIAGNOSIS — R10.31: ICD-10-CM

## 2017-08-30 DIAGNOSIS — F41.9: ICD-10-CM

## 2017-08-30 DIAGNOSIS — F43.10: ICD-10-CM

## 2017-08-30 DIAGNOSIS — K35.80: Primary | ICD-10-CM

## 2017-08-30 PROCEDURE — 80053 COMPREHEN METABOLIC PANEL: CPT

## 2017-08-30 PROCEDURE — 36415 COLL VENOUS BLD VENIPUNCTURE: CPT

## 2017-08-30 PROCEDURE — G0378 HOSPITAL OBSERVATION PER HR: HCPCS

## 2017-08-30 PROCEDURE — 74177 CT ABD & PELVIS W/CONTRAST: CPT

## 2017-08-30 PROCEDURE — 85025 COMPLETE CBC W/AUTO DIFF WBC: CPT

## 2017-08-30 PROCEDURE — 44970 LAPAROSCOPY APPENDECTOMY: CPT

## 2017-08-30 PROCEDURE — 84703 CHORIONIC GONADOTROPIN ASSAY: CPT

## 2017-08-30 PROCEDURE — 88304 TISSUE EXAM BY PATHOLOGIST: CPT

## 2017-08-30 PROCEDURE — 83690 ASSAY OF LIPASE: CPT

## 2017-08-30 RX ADMIN — ONDANSETRON PRN MG: 2 INJECTION, SOLUTION INTRAMUSCULAR; INTRAVENOUS at 15:42

## 2017-08-30 RX ADMIN — ONDANSETRON PRN MG: 2 INJECTION, SOLUTION INTRAMUSCULAR; INTRAVENOUS at 20:54

## 2017-08-30 NOTE — OR
SURGEON:

Juan Fonseca M.D.

 

DATE OF PROCEDURE:  08/30/2017

 

OPERATION PERFORMED:

Laparoscopic appendectomy.

 

FIRST ASSISTANT:

JOCELYNE Oneal student.

 

ANESTHESIA:

General endotracheal.

 

ASA CLASSIFICATION:

IIE.

 

PREOPERATIVE DIAGNOSIS:

Acute abdomen, early appendicitis.

 

POSTOPERATIVE DIAGNOSIS:

Early retrocecal appendicitis.

 

ESTIMATED BLOOD LOSS:

10 mL.

 

FLUID REPLACEMENT:

About 1500 mL.

 

DESCRIPTION OF PROCEDURE:

The patient was taken to the operating room and placed on the operating table in

the supine position.  Time-out was called for appropriate identification of the

patient and procedure.  Monitored anesthesia care was provided.  Thigh-high TEDs

and sequential compression boots were placed.  Following satisfactory attainment

of general endotracheal anesthesia, a Lassiter catheter was placed in the patient's

urinary bladder.  The abdomen was prepped with DuraPrep solution.  Sterile

drapes were applied.  The skin just above the umbilicus was infiltrated with

0.5% Marcaine solution.  The skin incision was made and deepened through the

subcutaneous tissue obtaining hemostasis with the use of electrocautery.  The

Veress needle was introduced into the peritoneal cavity.  Saline drop test was

positive.  Carbon dioxide pneumoperitoneum was established with the release set

at 13 cm of water.  Once we had a satisfactory pneumoperitoneum, 5 mm camera and

port were placed.  There was no acute inflammatory process noted.  Under camera

vision, 12 mm suprapubic and 5 mm left lower quadrant ports were placed.  Each

incision was preemptively infiltrated with 0.5% Marcaine solution.  The cecum

was grasped and the appendix easily delivered into the wound.  It did have a

slightly grayish appearance and the tip was slightly injected.  No abscess was

noted.  With the appendix pulled down into the right lower quadrant.  The

mesoappendix was taken down with the use of the Harmonic scalpel.  The appendix

was then doubly ligated at its base with 0 PDS endo-loops.  The appendix was

then transected using the Harmonic scalpel.  This was placed in an Endopouch and

secured.  The right lower quadrant was then irrigated with saline solution and

all fluid aspirated.  No purulent material was noted.  Once that was

accomplished, the patient was returned to a neutral position.  The Endopouch

containing appendix was removed through the 12 mm port.  Under camera vision, 12

mm and 5 mm left lower quadrant ports were removed.  Finally, the supraumbilical

camera and port were removed.  Wounds were inspected for hemostasis.  No

bleeding was noted.  The suprapubic and supraumbilical incisions were closed in

2 layers approximating the subcutaneous tissue with 3-0 Polysorb and the skin

with subcuticular 4-0 Monocryl.  The left lower quadrant port was closed with

subcuticular 4-0 Monocryl.  All incisions were Steri-Stripped and dressed with

sterile Tegaderm pads.

 

Prior to emergence from anesthesia, the Lassiter catheter was removed.  Following

emergence from anesthesia and extubation, the patient was taken to recovery room

in stable condition.

 

 

DOM BOX

DD:  08/30/2017 14:09:44

DT:  08/30/2017 21:48:54

Job #:  [id number]

## 2017-08-30 NOTE — PCM.PREANE
Preanesthetic Assessment





- Anesthesia/Transfusion/Family Hx


Anesthesia History: Prior Anesthesia Without Reaction


Family History of Anesthesia Reaction: No


Transfusion History: Prior Transfusion Without Reaction


Intubation History: Unknown





- Review of Systems


General: No Symptoms


Pulmonary: No Symptoms


Cardiovascular: No Symptoms


Gastrointestinal: Abdominal Pain


Neurological: No Symptoms


Other: Reports: None





- Physical Assessment


Height: 1.63 m


Weight: 53.524 kg


ASA Class: 2E


Mental Status: Alert & Oriented x3


Airway Class: Mallampati = 2


Dentition: Reports: Normal Dentition


Thyro-Mental Finger Breadths: 3


Mouth Opening Finger Breadths: 2


ROM/Head Extension: Full


Lungs: Clear to Auscultation, Normal Respiratory Effort


Cardiovascular: Regular Rate, Regular Rhythm





- Allergies


Allergies/Adverse Reactions: 


 Allergies











Allergy/AdvReac Type Severity Reaction Status Date / Time


 


diphenhydramine Allergy  Itching Verified 08/30/17 12:46





[From Benadryl]     














- Blood


Blood Available: No





- Anesthesia Plan


Pre-Op Medication Ordered: None





- Acknowledgements


Anesthesia Type Planned: General Anesthesia


Pt an Appropriate Candidate for the Planned Anesthesia: Yes


Alternatives and Risks of Anesthesia Discussed w Pt/Guardian: Yes


Pt/Guardian Understands and Agrees with Anesthesia Plan: Yes





PreAnesthesia Questionnaire





- Past Health History


Medical/Surgical History: Denies Medical/Surgical History


HEENT History: Reports: None


Cardiovascular History: Reports: None


Respiratory History: Reports: Other (See Below)


Other Respiratory History: occas wheezing, has taken inhalers in the past...no 

diagnosis


Gastrointestinal History: Reports: None, Other (See Below) (h/o gastriit)


Genitourinary History: Reports: None


OB/GYN History: Reports: None


Musculoskeletal History: Reports: None


Neurological History: Reports: None


Psychiatric History: Reports: Anxiety, Depression, Panic Attack


Other Psychiatric History: post traumatic stress disorder, conversion disorder


Endocrine/Metabolic History: Reports: None


Hematologic History: Reports: Blood Transfusion(s)


Other Hematologic History: as a baby





- Infectious Disease History


Infectious Disease History: Reports: None





- Past Surgical History


HEENT Surgical History: Reports: Adenoidectomy, Tonsillectomy





- SUBSTANCE USE


Smoking Status *Q: Never Smoker


Second Hand Smoke Exposure: No


Days Per Week of Alcohol Use: 3


Number of Drinks Per Day: 3


Total Drinks Per Week: 9


Recreational Drug Use History: No





- HOME MEDS


Home Medications: 


 Home Meds





Norgestrel-Ethinyl Estradiol [Cryselle-28 Tablet] 1 tab PO DAILY 08/30/17 [

History]











- CURRENT (IN HOUSE) MEDS


Current Meds: 





 Current Medications








Discontinued Medications





Fentanyl (Sublimaze) Confirm Administered Dose 200 mcg .ROUTE .STK-MED ONE


   Stop: 08/30/17 12:42


Lidocaine (Xylocaine-Mpf 2%) Confirm Administered Dose 5 ml .ROUTE .STK-MED ONE


   Stop: 08/30/17 12:42


Midazolam HCl (Versed 1 Mg/Ml) Confirm Administered Dose 2 mg .ROUTE .STK-MED 

ONE


   Stop: 08/30/17 12:42


Ondansetron HCl (Zofran) Confirm Administered Dose 4 mg .ROUTE .STK-MED ONE


   Stop: 08/30/17 12:42


Propofol (Diprivan  20 Ml) Confirm Administered Dose 200 mg .ROUTE .STK-MED ONE


   Stop: 08/30/17 12:42


Rocuronium Bromide (Zemuron) Confirm Administered Dose 100 mg .ROUTE .STK-MED 

ONE


   Stop: 08/30/17 12:42


Succinylcholine Chloride (Succinylcholine In Ns Pf) Confirm Administered Dose 

200 mg .ROUTE .STK-MED ONE


   Stop: 08/30/17 12:42

## 2017-08-30 NOTE — PCM.POSTAN
POST ANESTHESIA ASSESSMENT





- MENTAL STATUS


Mental Status: Alert, Oriented





- RESPIRATORY


Respiratory Status: Respiratory Rate WNL, Airway Patent, O2 Saturation Stable





- CARDIOVASCULAR


CV Status: Pulse Rate WNL, Blood Pressure Stable





- GASTROINTESTINAL


GI Status: No Symptoms





- PAIN


Pain Score: 5





- POST OP HYDRATION


Hydration Status: Adequate & Stable





- OBSERVATIONS


Free Text/Narrative:: 





no anesthesia problems

## 2017-08-30 NOTE — PCM.SN
- Free Text/Narrative


Note: 





Patient still having N/V.  Now states pain is similar to what she had pre-op.  

In PAR thought the pain was better.





Dressings dry.

## 2017-08-31 VITALS — SYSTOLIC BLOOD PRESSURE: 88 MMHG | DIASTOLIC BLOOD PRESSURE: 48 MMHG

## 2017-08-31 RX ADMIN — HYDROCODONE BITARTRATE AND ACETAMINOPHEN PRN TAB: 5; 325 TABLET ORAL at 02:11

## 2017-08-31 RX ADMIN — HYDROCODONE BITARTRATE AND ACETAMINOPHEN PRN TAB: 5; 325 TABLET ORAL at 08:56

## 2017-08-31 NOTE — PCM48HPAN
Post Anesthesia Note





- EVALUATION WITHIN 48HRS OF ANESTHETIC


Vital Signs in Normal Range: Yes


Patient Participated in Evaluation: Yes


Respiratory Function Stable: Yes


Airway Patent: Yes


Cardiovascular Function Stable: Yes


Hydration Status Stable: Yes


Pain Control Satisfactory: Yes


Nausea and Vomiting Control Satisfactory: Yes


Mental Status Recovered: Yes





- COMMENTS/OBSERVATIONS


Free Text/Narrative:: 





Mild complaints of nausea and post operative pain under control with oral 

medications.

## 2017-08-31 NOTE — PCM.SURGPN
- General Info


Date of Service: 08/31/17


POD#: 1


Admission Diagnosis/Problem: Acute abdomen


Functional Status: Reports: Pain Controlled, Tolerating Diet, Ambulating, 

Urinating





- Review of Systems


General: Reports: Weakness, Fatigue.  Denies: Fever, Malaise, Chills


HEENT: Reports: No Symptoms


Pulmonary: Reports: No Symptoms


Cardiovascular: Reports: No Symptoms


Gastrointestinal: Reports: Abdominal Pain, Decreased Appetite, Nausea.  Denies: 

Vomiting (subsided after stopping Morphine)


Genitourinary: Reports: No Symptoms


Musculoskeletal: Reports: No Symptoms


Skin: Reports: No Symptoms


Neurological: Reports: No Symptoms


Psychiatric: Reports: No Symptoms





- Patient Data


Vitals - Most Recent: 


 Last Vital Signs











Temp  98.2 F   08/31/17 11:00


 


Pulse  59 L  08/31/17 11:00


 


Resp  16   08/31/17 11:00


 


BP  88/48 L  08/31/17 11:00


 


Pulse Ox  94 L  08/31/17 11:00











Weight - Most Recent: 118 lb


I&O - Last 24 Hours: 


 Intake & Output











 08/31/17 08/31/17 08/31/17





 03:59 11:59 19:59


 


Intake Total 985 130 


 


Output Total  740 


 


Balance 985 -610 











Med Orders - Current: 


 Current Medications





Hydrocodone Bitart/Acetaminophen (Norco 325-5 Mg)  1 - 2 tab PO Q4H PRN


   PRN Reason: Pain (moderate 4-6)


   Last Admin: 08/31/17 08:56 Dose:  2 tab


Fentanyl (Sublimaze)  50 mcg IVPUSH Q5M PRN


   PRN Reason: Pain (severe 7-10)


   Stop: 08/31/17 14:14


   Last Admin: 08/30/17 14:35 Dose:  50 mcg


Lactated Ringer's (Ringers, Lactated)  1,000 mls @ 125 mls/hr IV ASDIRECTED KEL


   Last Admin: 08/31/17 02:14 Dose:  125 mls/hr


Meperidine HCl (Demerol)  10 - 40 mg IVPUSH Q1H PRN


   PRN Reason: Pain


   Last Admin: 08/30/17 21:02 Dose:  10 mg


Metoclopramide HCl (Reglan)  10 mg IV Q6H KEL


   Last Admin: 08/31/17 07:39 Dose:  10 mg


Ondansetron HCl (Zofran)  4 mg IVPUSH Q4H PRN


   PRN Reason: Nausea/Vomiting


   Last Admin: 08/31/17 08:36 Dose:  4 mg


Promethazine HCl (Phenergan)  25 mg IM Q6H PRN


   PRN Reason: Nausea/Vomiting


   Last Admin: 08/30/17 22:37 Dose:  25 mg





Discontinued Medications





Bupivacaine HCl (Sensorcaine-Mpf 0.5%) Confirm Administered Dose 10 ml .ROUTE 

.STK-MED ONE


   Stop: 08/30/17 15:14


Cefoxitin Sodium (Mefoxin) Confirm Administered Dose 1 gm .ROUTE .STK-MED ONE


   Stop: 08/30/17 13:06


Ephedrine Sulfate (Ephedrine Sulfate) Confirm Administered Dose 50 mg .ROUTE 

.STK-MED ONE


   Stop: 08/30/17 13:16


Fentanyl (Sublimaze) Confirm Administered Dose 200 mcg .ROUTE .STK-MED ONE


   Stop: 08/30/17 12:42


Fentanyl (Sublimaze) Confirm Administered Dose 100 mcg .ROUTE .STK-MED ONE


   Stop: 08/30/17 13:40


Fentanyl (Sublimaze) Confirm Administered Dose 100 mcg .ROUTE .STK-MED ONE


   Stop: 08/30/17 13:50


Fentanyl (Sublimaze) Confirm Administered Dose 100 mcg .ROUTE .STK-MED ONE


   Stop: 08/30/17 14:01


   Last Admin: 08/30/17 15:31 Dose:  Not Given


Glycopyrrolate () Confirm Administered Dose 1 mg .ROUTE .STK-MED ONE


   Stop: 08/30/17 13:17


Sodium Chloride (Normal Saline) Confirm Administered Dose 20 mls @ as directed 

.ROUTE .STK-MED ONE


   Stop: 08/30/17 13:06


Ketorolac Tromethamine (Toradol)  30 mg IVPUSH ONETIME ONE


   Stop: 08/30/17 14:12


   Last Admin: 08/30/17 14:21 Dose:  30 mg


Lidocaine (Xylocaine-Mpf 2%) Confirm Administered Dose 5 ml .ROUTE .STK-MED ONE


   Stop: 08/30/17 12:42


Metoclopramide HCl (Reglan)  10 mg IVPUSH ONETIME ONE


   Stop: 08/30/17 17:25


   Last Admin: 08/30/17 17:30 Dose:  10 mg


Metoprolol Tartrate (Lopressor) Confirm Administered Dose 5 mg .ROUTE .STK-MED 

ONE


   Stop: 08/30/17 13:25


Midazolam HCl (Versed 1 Mg/Ml) Confirm Administered Dose 2 mg .ROUTE .STK-MED 

ONE


   Stop: 08/30/17 12:42


Midazolam HCl (Versed 1 Mg/Ml) Confirm Administered Dose 2 mg .ROUTE .STK-MED 

ONE


   Stop: 08/30/17 14:05


   Last Admin: 08/30/17 15:31 Dose:  Not Given


Morphine Sulfate (Morphine)  1 - 5 mg IVPUSH Q30M PRN


   PRN Reason: Pain (severe 7-10)


   Last Admin: 08/30/17 20:09 Dose:  3 mg


Ondansetron HCl (Zofran) Confirm Administered Dose 4 mg .ROUTE .STK-MED ONE


   Stop: 08/30/17 12:42


Ondansetron HCl (Zofran)  4 mg IVPUSH Q6H PRN


   PRN Reason: Nausea/Vomiting


   Last Admin: 08/30/17 20:54 Dose:  4 mg


Propofol (Diprivan  20 Ml) Confirm Administered Dose 200 mg .ROUTE .STK-MED ONE


   Stop: 08/30/17 12:42


Rocuronium Bromide (Zemuron) Confirm Administered Dose 100 mg .ROUTE .STK-MED 

ONE


   Stop: 08/30/17 12:42


Succinylcholine Chloride (Succinylcholine In Ns Pf) Confirm Administered Dose 

200 mg .ROUTE .STK-MED ONE


   Stop: 08/30/17 12:42











- Exam


Wound/Incisions: Dressing Dry and Intact, No Drainage


General: Alert, Oriented, Cooperative, Mild Distress


HEENT: Pupils Equal, Pupils Reactive, EOMI


Neck: Supple


Lungs: Clear to Auscultation, Normal Respiratory Effort


Cardiovascular: Regular Rate, Regular Rhythm


GI/Abdominal Exam: Soft, Non-Tender, No Distention, Abnormal Bowel Sounds (

hypoactive).  No: Guarding, Rigid, Rebound


Extremities: Normal Inspection


Skin: Warm, Dry, Intact


Psy/Mental Status: Alert, Normal Affect, Normal Mood





- Problem List & Annotations


(1) Appendicitis


SNOMED Code(s): 35407810


   Code(s): K37 - UNSPECIFIED APPENDICITIS   Status: Acute   Current Visit: Yes

   





- Problem List Review


Problem List Initiated/Reviewed/Updated: Yes





- My Orders


Last 24 Hours: 


 Active Orders 24 hr











 Category Date Time Status


 


 Patient Status [ADT] Routine ADT  08/30/17 14:56 Active


 


 Antiembolic Devices [RC] PER UNIT ROUTINE Care  08/30/17 14:11 Active


 


 Bradycardia-Neuroaxis [employer] [RC] ROUTINE Care  08/30/17 14:14 Active


 


 Hypertension-Neuroaxis [employer] [RC] ROUTINE Care  08/30/17 14:14 Active


 


 Hypotension-Neuroaxis [employer] [RC] ROUTINE Care  08/30/17 14:14 Active


 


 Oxygen Therapy [RC] PRN Care  08/30/17 14:10 Active


 


 Pulse Oximetry [RC] INTERMITTENT Care  08/30/17 14:10 Active


 


 RT Incentive Spirometry [RC] Q1HWA Care  08/30/17 14:10 Active


 


 Ready for Discharge [RC] PER UNIT ROUTINE Care  08/30/17 14:16 Active


 


 Up ad Nikki [RC] PER UNIT ROUTINE Care  08/30/17 14:10 Active


 


 Vital Signs [RC] PER UNIT ROUTINE Care  08/30/17 14:10 Active


 


 Full Liquid Diet [DIET] Diet  08/30/17 Dinner Active


 


 Acetaminophen/HYDROcodone [Norco 325-5 MG] Med  08/30/17 14:11 Active





 1 - 2 tab PO Q4H PRN   


 


 Lactated Ringers [Ringers, Lactated] 1,000 ml Med  08/30/17 14:15 Active





 IV ASDIRECTED   


 


 Meperidine [Demerol] Med  08/30/17 20:48 Active





 10 - 40 mg IVPUSH Q1H PRN   


 


 Metoclopramide [Reglan] Med  08/31/17 02:00 Active





 10 mg IV Q6H   


 


 Ondansetron [Zofran] Med  08/30/17 21:45 Active





 4 mg IVPUSH Q4H PRN   


 


 Promethazine [Phenergan] Med  08/30/17 21:43 Active





 25 mg IM Q6H PRN   


 


 fentaNYL [Sublimaze] Med  08/30/17 14:14 Active





 50 mcg IVPUSH Q5M PRN   


 


 Antiembolic Hose [OM.PC] PER UNIT ROUTINE Oth  08/31/17 06:00 Ordered


 


 Sequential Compression Device [OM.PC] Routine Oth  08/30/17 14:11 Ordered








 Medication Orders





Hydrocodone Bitart/Acetaminophen (Norco 325-5 Mg)  1 - 2 tab PO Q4H PRN


   PRN Reason: Pain (moderate 4-6)


   Last Admin: 08/31/17 08:56  Dose: 2 tab


   Admin: 08/31/17 02:11  Dose: 2 tab


Fentanyl (Sublimaze)  50 mcg IVPUSH Q5M PRN


   PRN Reason: Pain (severe 7-10)


   Stop: 08/31/17 14:14


   Last Admin: 08/30/17 14:35  Dose: 50 mcg


Lactated Ringer's (Ringers, Lactated)  1,000 mls @ 125 mls/hr IV [facility]IRECTED [facility]


   Last Admin: 08/31/17 02:14  Dose: 125 mls/hr


   Infusion: 08/31/17 02:14  Dose: 125 mls/hr


   Infusion: 08/31/17 02:13  Dose: 125 mls/hr


   Admin: 08/30/17 18:20  Dose: 125 mls/hr


   Infusion: 08/30/17 18:20  Dose: 125 mls/hr


   Admin: 08/30/17 15:10  Dose: 125 mls/hr


Meperidine HCl (Demerol)  10 - 40 mg IVPUSH Q1H PRN


   PRN Reason: Pain


   Last Admin: 08/30/17 21:02  Dose: 10 mg


Metoclopramide HCl (Reglan)  10 mg IV Q6H [facility]


   Last Admin: 08/31/17 07:39  Dose: 10 mg


   Admin: 08/31/17 02:07  Dose: 10 mg


Ondansetron HCl (Zofran)  4 mg IVPUSH Q4H PRN


   PRN Reason: Nausea/Vomiting


   Last Admin: 08/31/17 08:36  Dose: 4 mg


Promethazine HCl (Phenergan)  25 mg IM Q6H PRN


   PRN Reason: Nausea/Vomiting


   Last Admin: 08/30/17 22:37  Dose: 25 mg











- Assessment


Assessment           (Free Text/Narrative):: 





Patient is feeling better today and would like to go home.


Afebrile.  VSS.  Tolerating po liquids.





- Plan


Plan                        (Free Text/Narrative):: 





Home today.


Norco and Zofran.


Clinic 7-10 days.


May return to light duty on 9/5.

## 2017-09-07 ENCOUNTER — HOSPITAL ENCOUNTER (INPATIENT)
Dept: HOSPITAL 56 - MW.ED | Age: 22
LOS: 4 days | Discharge: HOME | DRG: 861 | End: 2017-09-11
Attending: SURGERY | Admitting: SURGERY
Payer: COMMERCIAL

## 2017-09-07 DIAGNOSIS — K50.00: ICD-10-CM

## 2017-09-07 DIAGNOSIS — Z88.8: ICD-10-CM

## 2017-09-07 DIAGNOSIS — R10.31: ICD-10-CM

## 2017-09-07 DIAGNOSIS — G89.18: Primary | ICD-10-CM

## 2017-09-07 DIAGNOSIS — F41.8: ICD-10-CM

## 2017-09-07 DIAGNOSIS — R11.2: ICD-10-CM

## 2017-09-07 DIAGNOSIS — Z79.899: ICD-10-CM

## 2017-09-07 LAB
CHLORIDE SERPL-SCNC: 109 MMOL/L (ref 98–110)
SODIUM SERPL-SCNC: 142 MMOL/L (ref 136–146)

## 2017-09-07 PROCEDURE — C9113 INJ PANTOPRAZOLE SODIUM, VIA: HCPCS

## 2017-09-07 PROCEDURE — G0378 HOSPITAL OBSERVATION PER HR: HCPCS

## 2017-09-07 NOTE — PCM.HP
H&P History of Present Illness





- General


Date of Service: 09/07/17


Admit Problem/Dx: 


 Admission Diagnosis/Problem





Admission Diagnosis/Problem      Post op abdominal pain, N/V








Source of Information: Patient


History Limitations: Reports: No Limitations





- History of Present Illness


Duration of Symptoms: Reports: Day(s):


Location: Reports: Abdomen


Quality: Reports: Pressure, Stabbing, Throbbing


Severity: Moderate


Improves with: Reports: Rest


Worsens with: Reports: Eating, Movement


Context: Reports: Sick Contact


Associated Symptoms: Reports: Loss of Appetite, Nausea/Vomiting.  Denies: Fever/

Chills


Other HPI/Comments: 





Lap appy for early appendicitis on 8/30


  ** Right Middle Abdominal


Pain Score (Numeric/FACES): 10





- Related Data


Allergies/Adverse Reactions: 


 Allergies











Allergy/AdvReac Type Severity Reaction Status Date / Time


 


diphenhydramine Allergy  Itching Verified 09/07/17 15:37





[From Benadryl]     











Home Medications: 


 Home Meds





Bismuth Subsalicylate [Pepto Bismol] 262 mg PO ONETIME 08/30/17 [History]


Norgestrel-Ethinyl Estradiol [Cryselle-28 Tablet] 1 tab PO DAILY 08/30/17 [

History]


Acetaminophen/HYDROcodone [Norco 325-5 MG] 1 tab PO Q8H PRN #20 tablet 08/31/17 

[Rx]


Ondansetron [Zofran ODT] 4 mg PO Q6H PRN #30 tab.dis 08/31/17 [Rx]











Past Medical History





- Past Health History


Medical/Surgical History: Denies Medical/Surgical History


HEENT History: Reports: None


Cardiovascular History: Reports: None


Respiratory History: Reports: Other (See Below)


Other Respiratory History: occas wheezing, has taken inhalers in the past...no 

diagnosis


Gastrointestinal History: Reports: None, Other (See Below)


Genitourinary History: Reports: None


OB/GYN History: Reports: None


Musculoskeletal History: Reports: None


Neurological History: Reports: None


Psychiatric History: Reports: Anxiety, Depression, Panic Attack, PTSD


Other Psychiatric History: post traumatic stress disorder, conversion disorder


Endocrine/Metabolic History: Reports: None


Hematologic History: Reports: Blood Transfusion(s)


Other Hematologic History: as a baby





- Infectious Disease History


Infectious Disease History: Reports: None





- Past Surgical History


HEENT Surgical History: Reports: Adenoidectomy, Tonsillectomy


Other HEENT Surgeries/Procedures: addenoidectomy


GI Surgical History: Reports: Appendectomy


Female  Surgical History: Reports: None





Social & Family History





- Family History


Family Medical History: Noncontributory





- Tobacco Use


Smoking Status *Q: Never Smoker


Second Hand Smoke Exposure: No





- Caffeine Use


Caffeine Use: Reports: None





- Alcohol Use


Days Per Week of Alcohol Use: 3


Number of Drinks Per Day: 3


Total Drinks Per Week: 9





- Recreational Drug Use


Recreational Drug Use: No


Drug Use in Last 12 Months: No





H&P Review of Systems





- Review of Systems:


Review Of Systems: See Below


General: Reports: Fever (low grade per patient), Decreased Appetite, Weight Loss


HEENT: Reports: No Symptoms


Pulmonary: Denies: Wheezing


Cardiovascular: Denies: Chest Pain, Palpitations


Gastrointestinal: Reports: Abdominal Pain, Anorexia, Decreased Appetite, Flatus

, Nausea, Vomiting.  Denies: Black Stool, Bloody Stool, Constipation, Diarrhea, 

Distension


Genitourinary: Reports: No Symptoms


Musculoskeletal: Reports: No Symptoms


Skin: Reports: No Symptoms


Psychiatric: Reports: Anxiety


Neurological: Reports: No Symptoms


Hematologic/Lymphatic: Reports: No Symptoms


Immunologic: Reports: No Symptoms





Exam





- Exam


Exam: See Below





- Vital Signs


Vital Signs: 


 Last Vital Signs











Temp  97.7 F   09/07/17 15:38


 


Pulse  72   09/07/17 19:21


 


Resp  18   09/07/17 19:21


 


BP  128/66   09/07/17 19:21


 


Pulse Ox  98   09/07/17 19:21











Weight: 118 lb 2.684 oz





- Exam


General: Alert, Oriented, Cooperative, Moderate Distress


HEENT: Conjunctiva Clear, Pupils Equal, Pupils Reactive.  No: Scleral Icterus


Neck: Supple, Trachea Midline


Lungs: Clear to Auscultation, Normal Respiratory Effort


Cardiovascular: Regular Rate, Regular Rhythm, Normal S1, Normal S2.  No: 

Tachycardia


GI/Abdominal Exam: Normal Bowel Sounds, Soft, No Distention, Tender.  No: 

Guarding, Rigid, Rebound, Hernia, Mass


 (Female) Exam: Deferred


Rectal (Female) Exam: Deferred


Back Exam: Normal Inspection


Extremities: Normal Inspection, Non-Tender, No Pedal Edema


Skin: Warm, Dry, Intact, Incision (All incisions are clean and dry.)


Neurological: Cranial Nerves Intact


Neuro Extensive - Mental Status: Alert, Oriented x3


Psychiatric: Alert, Anxious





- Patient Data


Result Diagrams: 


 09/07/17 16:48





 09/07/17 16:48


Imaging Impressions Last 24 hrs: 





CT scan and report personally reviewed.  No abscess per Radiology.  Patient 

does have thickening of the terminal ileum and cecum with mesenteric adenopathy-

-? mesenteric adenitis vs inflammatory bowel disease.





*Q Meaningful Use (ADM)





- VTE *Q


VTE Criteria *Q: 








- Stroke *Q


Stroke Criteria *Q: 








- AMI *Q


AMI Criteria *Q: 








- Problem List


(1) Abdominal pain


SNOMED Code(s): 72080356


   ICD Code: R10.9 - UNSPECIFIED ABDOMINAL PAIN   Status: Acute   Priority: 

High   Current Visit: Yes   





(2) Anxiety


SNOMED Code(s): 18659068


   ICD Code: F41.9 - ANXIETY DISORDER, UNSPECIFIED   Status: Acute   Priority: 

Medium   Current Visit: Yes   


Problem List Initiated/Reviewed/Updated: Yes


Orders Last 24hrs: 


 Active Orders 24 hr











 Category Date Time Status


 


 Antiembolic Devices [RC] PER UNIT ROUTINE Care  09/07/17 19:29 Ordered


 


 Up ad Nikki [RC] PER UNIT ROUTINE Care  09/07/17 19:28 Ordered


 


 Vital Signs [RC] PER UNIT ROUTINE Care  09/07/17 19:28 Ordered


 


 Full Liquid Diet [DIET] Diet  09/08/17 Breakfast Ordered


 


 BASIC METABOLIC PANEL,BMP [CHEM] AM Lab  09/08/17 05:11 Ordered


 


 CBC WITH AUTO DIFF [HEME] AM Lab  09/08/17 05:11 Ordered


 


 Acetaminophen/HYDROcodone [Norco 325-5 MG] Med  09/07/17 19:29 Ordered





 1 - 2 tab PO Q4H PRN   


 


 Lactated Ringers @ 125 MLS/HR(1000ml) Med  09/07/17 19:45 Ordered





 Lactated Ringers [Ringers, Lactated] 1,000 ml   





 IV ASDIRECTED   


 


 Metoclopramide [Reglan] Med  09/07/17 19:30 Ordered





 10 mg IV Q6H   


 


 Morphine Med  09/07/17 19:29 Ordered





 See Dose Instructions  IVPUSH Q1H PRN   


 


 Ondansetron [Zofran] Med  09/07/17 19:29 Ordered





 4 mg IVPUSH Q6H PRN   


 


 Sequential Compression Device [OM.PC] Routine Oth  09/07/17 19:29 Ordered


 


 Resuscitation Status Routine Resus Stat  09/07/17 19:29 Ordered











Assessment/Plan Comment:: 





Patient will be admitted to OBS status for IV fluiids, pain and nausea 

management.


Hospitalist consult--Dr. Bowman, he has been notified by me.

## 2017-09-07 NOTE — EDM.PDOC
ED HPI GENERAL MEDICAL PROBLEM





- General


Chief Complaint: Abdominal Pain


Stated Complaint: CT LIQUID MADE PAT THROW UP


Time Seen by Provider: 09/07/17 15:45


Source of Information: Reports: Patient


History Limitations: Reports: No Limitations





- History of Present Illness


INITIAL COMMENTS - FREE TEXT/NARRATIVE: 


History of present illness:


[22-year-old female presents to the ED are status post ingestion of oral 

contrast media in preparation for an abdominal CT when the media made her very 

nauseated and she started vomiting. Patient indicates now she continues to be 

nauseous as well as extreme abdominal pain. Patient was having a CT to evaluate 

continued pain status post lap appendectomy on 8/30 by Dr. Fonseca.]





Review of systems: 


As per history of present illness and below otherwise all systems reviewed and 

negative.





Past medical history: 


As per history of present illness and as reviewed below otherwise 

noncontributory.





Surgical history: 


As per history of present illness and as reviewed below otherwise 

noncontributory.





Social history: 


No reported history of drug or alcohol abuse.





Family history: 


As per history of present illness and as reviewed below otherwise 

noncontributory.





Physical exam:


HEENT: Atraumatic, normocephalic, pupils reactive, negative for conjunctival 

pallor or scleral icterus, mucous membranes moist, throat clear, neck supple, 

nontender, trachea midline.


Lungs: Clear to auscultation, breath sounds equal bilaterally, chest nontender.


Heart: S1S2, regular, negative for clicks, rubs, or JVD.


Abdomen: Soft, nondistended, nontender. Negative for masses or 

hepatosplenomegaly. Negative for costovertebral tenderness.


Pelvis: Stable nontender.


Genitourinary: Deferred.


Rectal: Deferred.


Extremities: Atraumatic, negative for cords or calf pain. Neurovascular 

unremarkable.


Neuro: Awake, alert, oriented. Cranial nerves II through XII unremarkable. 

Cerebellum unremarkable. Motor and sensory unremarkable throughout. Exam 

nonfocal.








Spoke with Dr. Fonseca on the phone at approximately 1630 to dialogue about 

plan of care for patient indicated that he would desire a IV contrast CT of 

abdomen for further deeper diagnostic evaluation secondary to patient's 

inability to swallow contrast media.








Dr. Anderson called at 1900 he indicated he would come in and evaluate the 

patient at the bedside.





Dr. Fonseca here decision was made to admit patient for observation and pain 

management as well as nausea vomiting control.





Diagnostics:


[IV contrast CT of abdomen and pelvis, CBC, CMP, serum hCG qualitative]





Therapeutics:


[IV fluid, Zofran, morphine, Ativan]





Impression: 


[#1 nausea and vomiting]





Plan:


[Admit to Dr. Fonseca for observation]





Definitive disposition and diagnosis as appropriate pending reevaluation and 

review of above.








  ** Right Middle Abdominal


Pain Score (Numeric/FACES): 10





- Related Data


 Allergies











Allergy/AdvReac Type Severity Reaction Status Date / Time


 


diphenhydramine Allergy  Itching Verified 09/07/17 15:37





[From Benadryl]     











Home Meds: 


 Home Meds





Bismuth Subsalicylate [Pepto Bismol] 262 mg PO ONETIME 08/30/17 [History]


Norgestrel-Ethinyl Estradiol [Cryselle-28 Tablet] 1 tab PO DAILY 08/30/17 [

History]


Acetaminophen/HYDROcodone [Norco 325-5 MG] 1 tab PO Q8H PRN #20 tablet 08/31/17 

[Rx]


Ondansetron [Zofran ODT] 4 mg PO Q6H PRN #30 tab.dis 08/31/17 [Rx]











Past Medical History





- Past Health History


Medical/Surgical History: Denies Medical/Surgical History


HEENT History: Reports: None


Cardiovascular History: Reports: None


Respiratory History: Reports: Other (See Below)


Other Respiratory History: occas wheezing, has taken inhalers in the past...no 

diagnosis


Gastrointestinal History: Reports: None, Other (See Below)


Genitourinary History: Reports: None


OB/GYN History: Reports: None


Musculoskeletal History: Reports: None


Neurological History: Reports: None


Psychiatric History: Reports: Anxiety, Depression, Panic Attack


Other Psychiatric History: post traumatic stress disorder, conversion disorder


Endocrine/Metabolic History: Reports: None


Hematologic History: Reports: Blood Transfusion(s)


Other Hematologic History: as a baby





- Infectious Disease History


Infectious Disease History: Reports: None





- Past Surgical History


HEENT Surgical History: Reports: Adenoidectomy, Tonsillectomy


Other HEENT Surgeries/Procedures: addenoidectomy


Female  Surgical History: Reports: None





Social & Family History





- Family History


Family Medical History: Noncontributory





- Tobacco Use


Smoking Status *Q: Never Smoker


Second Hand Smoke Exposure: No





- Caffeine Use


Caffeine Use: Reports: None





- Alcohol Use


Days Per Week of Alcohol Use: 3


Number of Drinks Per Day: 3


Total Drinks Per Week: 9





- Recreational Drug Use


Recreational Drug Use: No


Drug Use in Last 12 Months: No





ED ROS GENERAL





- Review of Systems


Review Of Systems: See Below (History of present illness)





ED EXAM, GI/ABD





- Physical Exam


Exam: See Below (See history of present illness)





Course





- Vital Signs


Last Recorded V/S: 


 Last Vital Signs











Temp  36.5 C   09/07/17 15:38


 


Pulse  72   09/07/17 19:21


 


Resp  18   09/07/17 19:21


 


BP  128/66   09/07/17 19:21


 


Pulse Ox  98   09/07/17 19:21














- Orders/Labs/Meds


Orders: 


 Active Orders 24 hr











 Category Date Time Status


 


 Patient Status [ADT] Stat ADT  09/07/17 19:26 Ordered


 


 Abdomen Pelvis w Cont [CT] Stat Exams  09/07/17 16:33 Stop Req


 


 Abdomen Pelvis w Cont [CT] Stat Exams  09/07/17 16:33 Taken











Labs: 


 Laboratory Tests











  09/07/17 09/07/17 09/07/17 Range/Units





  16:48 16:48 16:48 


 


WBC  8.49    (4.0-11.0)  K/uL


 


RBC  4.49    (4.30-5.90)  M/uL


 


Hgb  13.7    (12.0-16.0)  g/dL


 


Hct  40.0    (36.0-46.0)  %


 


MCV  89.1    (80.0-98.0)  fL


 


MCH  30.5    (27.0-32.0)  pg


 


MCHC  34.3    (31.0-37.0)  g/dL


 


RDW Std Deviation  48.1    (28.0-62.0)  fl


 


RDW Coeff of Gali  15    (11.0-15.0)  %


 


Plt Count  324    (150-400)  K/uL


 


MPV  9.00    (7.40-12.00)  fL


 


Neut % (Auto)  73.8    (48.0-80.0)  %


 


Lymph % (Auto)  18.6    (16.0-40.0)  %


 


Mono % (Auto)  6.8    (0.0-15.0)  %


 


Eos % (Auto)  0.6    (0.0-7.0)  %


 


Baso % (Auto)  0.2    (0.0-1.5)  %


 


Neut # (Auto)  6.3 H    (1.4-5.7)  K/uL


 


Lymph # (Auto)  1.6    (0.6-2.4)  K/uL


 


Mono # (Auto)  0.6    (0.0-0.8)  K/uL


 


Eos # (Auto)  0.1    (0.0-0.7)  K/uL


 


Baso # (Auto)  0.0    (0.0-0.1)  K/uL


 


Nucleated RBC %  0.0    /100WBC


 


Nucleated RBCs #  0    K/uL


 


Sodium   142   (136-146)  mmol/L


 


Potassium   3.8   (3.5-5.1)  mmol/L


 


Chloride   109   ()  mmol/L


 


Carbon Dioxide   21   (21-31)  mmol/L


 


BUN   8   (6.0-23.0)  mg/dL


 


Creatinine   0.8   (0.6-1.5)  mg/dL


 


Est Cr Clr Drug Dosing   94.13   mL/min


 


Estimated GFR (MDRD)   > 60.0   ml/min


 


Glucose   86   ()  mg/dL


 


Calcium   9.5   (8.8-10.8)  mg/dL


 


Total Bilirubin   0.4   (0.1-1.5)  mg/dL


 


AST   37   (5-40)  IU/L


 


ALT   36   (8-54)  IU/L


 


Alkaline Phosphatase   65   ()  


 


Total Protein   7.6   (6.0-8.0)  g/dL


 


Albumin   4.3   (3.5-5.0)  g/dL


 


Globulin   3.3   (2.0-3.5)  g/dL


 


Albumin/Globulin Ratio   1.3   (1.3-2.8)  


 


HCG, Qual    NEGATIVE  (NEG)  











Meds: 


Medications














Discontinued Medications














Generic Name Dose Route Start Last Admin





  Trade Name Freq  PRN Reason Stop Dose Admin


 


Sodium Chloride  1,000 mls @ 999 mls/hr  09/07/17 15:49  09/07/17 16:12





  Normal Saline  IV  09/07/17 16:49  999 mls/hr





  STAT ONE   Administration


 


Prochlorperazine Edisylate 10  52 mls @ 150 mls/hr  09/07/17 16:25  09/07/17 17:

02





  mg/ Sodium Chloride  IV  09/07/17 16:45  150 mls/hr





  ONETIME ONE   Administration


 


Sodium Chloride  1,000 mls @ 999 mls/hr  09/07/17 18:17  09/07/17 18:20





  Normal Saline  IV  09/07/17 19:17  999 mls/hr





  .Bolus ONE   Administration


 


Iopamidol  70 ml  09/07/17 16:48  09/07/17 16:48





  Isovue Multipack-370 (76%)  IVPUSH  09/07/17 16:49  70 ml





  ONETIME STA   Administration


 


Lorazepam  2 mg  09/07/17 16:23  09/07/17 16:44





  Ativan  IVPUSH  09/07/17 16:24  2 mg





  ONETIME ONE   Administration


 


Morphine Sulfate  2 mg  09/07/17 15:49  09/07/17 16:03





  Morphine  IV  09/07/17 15:50  2 mg





  ONETIME ONE   Administration


 


Ondansetron HCl  4 mg  09/07/17 15:49  09/07/17 16:03





  Zofran  IVPUSH  09/07/17 15:50  4 mg





  ONETIME ONE   Administration














Departure





- Departure


Time of Disposition: 19:28


Disposition: Refer to Observation


Condition: Good


Clinical Impression: 


 Abdominal pain








- Discharge Information


Referrals: 


Elisha Logan NP [Primary Care Provider] - 


Forms:  ED Department Discharge





- My Orders


Last 24 Hours: 


My Active Orders





09/07/17 16:33


Abdomen Pelvis w Cont [CT] Stat 


Abdomen Pelvis w Cont [CT] Stat 





09/07/17 19:26


Patient Status [ADT] Stat 














- Assessment/Plan


Last 24 Hours: 


My Active Orders





09/07/17 16:33


Abdomen Pelvis w Cont [CT] Stat 


Abdomen Pelvis w Cont [CT] Stat 





09/07/17 19:26


Patient Status [ADT] Stat

## 2017-09-08 LAB
CHLORIDE SERPL-SCNC: 111 MMOL/L (ref 98–110)
SODIUM SERPL-SCNC: 139 MMOL/L (ref 136–146)

## 2017-09-08 RX ADMIN — HYDROCODONE BITARTRATE AND ACETAMINOPHEN PRN TAB: 5; 325 TABLET ORAL at 11:55

## 2017-09-08 RX ADMIN — METHYLPREDNISOLONE SODIUM SUCCINATE SCH MG: 125 INJECTION, POWDER, FOR SOLUTION INTRAMUSCULAR; INTRAVENOUS at 17:48

## 2017-09-08 RX ADMIN — ONDANSETRON PRN MG: 2 INJECTION, SOLUTION INTRAMUSCULAR; INTRAVENOUS at 10:05

## 2017-09-08 RX ADMIN — ONDANSETRON PRN MG: 2 INJECTION, SOLUTION INTRAMUSCULAR; INTRAVENOUS at 20:13

## 2017-09-08 RX ADMIN — HYDROCODONE BITARTRATE AND ACETAMINOPHEN PRN TAB: 5; 325 TABLET ORAL at 03:52

## 2017-09-08 RX ADMIN — ONDANSETRON PRN MG: 2 INJECTION, SOLUTION INTRAMUSCULAR; INTRAVENOUS at 16:31

## 2017-09-08 RX ADMIN — HYDROMORPHONE HCL-SODIUM CHLORIDE 0.9% INJ 6 MG/30ML SCH MG: 0.2 SOLUTION at 17:48

## 2017-09-08 NOTE — PCM.SN
- Free Text/Narrative


Note: 





The patient was seen later in the afternoon when her aunt and mother were 

there. The patient was seen rocking back and forth reportedly in severe pain as 

well as dry heaves. I had a discussion with the patient's mother with regards 

to the concerns. Also the patient will not be started on antibiotics is a do 

not think that they are appropriate at this time. This may be reconsidered 

later if there are changes. I've increased the patient's Zofran to 8 mg IV 

every 4 hours as needed and I placed the patient on a Dilaudid PCA. I've also 

ordered that the patient have IV Solu-Medrol secondary to his severe nausea 

vomiting rather than the prednisone. The patient will be started on Solu-Medrol 

at 125 mg IV 3 times a day. The patient will be followed in the morning.

## 2017-09-08 NOTE — PCM.CONSN
- General Info


Date of Service: 09/08/17


Admission Dx/Problem (Free Text): 





Prolonged abdominal pain secondary to laparoscopic cholecystectomy


Subjective Update: 





The patient is a 22-year-old lady who underwent laparoscopic appendectomy and 

is still having significant problem with right lower quadrant abdominal pain 

along with some nausea and vomiting. The patient underwent appendectomy on 

August 30, 2017. Surgeon had reported some edema of the terminal ileum without 

specific inflammation that could be related to Crohn's disease. The patient's 

surgeon has asked for assistance with medical management of his patient. 

Patient has denied any fever chills but she has felt warm. The patient has 

rated her pain as a 8 out of 10. She has had no specific aggravating or 

relieving factors.


Functional Status: Denies: Pain Controlled





- Review of Systems


General: Reports: Weakness


HEENT: Reports: No Symptoms


Pulmonary: Reports: No Symptoms


Cardiovascular: Reports: No Symptoms


Gastrointestinal: Reports: Abdominal Pain


Genitourinary: Reports: No Symptoms


Musculoskeletal: Reports: No Symptoms


Skin: Reports: No Symptoms


Neurological: Reports: No Symptoms


Psychiatric: Reports: No Symptoms





- Patient Data


Vitals - Most Recent: 


 Last Vital Signs











Temp  36.1 C   09/08/17 08:00


 


Pulse  73   09/08/17 08:00


 


Resp  16   09/08/17 08:00


 


BP  115/67   09/08/17 08:00


 


Pulse Ox  97   09/08/17 08:00











Weight - Most Recent: 53.6 kg


I&O - Last 24 Hours: 


 Intake & Output











 09/07/17 09/08/17 09/08/17





 22:59 06:59 14:59


 


Intake Total  1025 


 


Output Total  650 


 


Balance  375 











Lab Results Last 24 Hours: 


 Laboratory Results - last 24 hr











  09/08/17 09/08/17 09/08/17 Range/Units





  05:13 05:13 05:13 


 


WBC  7.60    (4.0-11.0)  K/uL


 


RBC  3.79 L    (4.30-5.90)  M/uL


 


Hgb  11.5 L    (12.0-16.0)  g/dL


 


Hct  33.7 L    (36.0-46.0)  %


 


MCV  88.9    (80.0-98.0)  fL


 


MCH  30.3    (27.0-32.0)  pg


 


MCHC  34.1    (31.0-37.0)  g/dL


 


RDW Std Deviation  47.2    (28.0-62.0)  fl


 


RDW Coeff of Gali  15    (11.0-15.0)  %


 


Plt Count  263    (150-400)  K/uL


 


MPV  9.10    (7.40-12.00)  fL


 


Neut % (Auto)  68.5    (48.0-80.0)  %


 


Lymph % (Auto)  22.6    (16.0-40.0)  %


 


Mono % (Auto)  7.8    (0.0-15.0)  %


 


Eos % (Auto)  0.8    (0.0-7.0)  %


 


Baso % (Auto)  0.3    (0.0-1.5)  %


 


Neut # (Auto)  5.2    (1.4-5.7)  K/uL


 


Lymph # (Auto)  1.7    (0.6-2.4)  K/uL


 


Mono # (Auto)  0.6    (0.0-0.8)  K/uL


 


Eos # (Auto)  0.1    (0.0-0.7)  K/uL


 


Baso # (Auto)  0.0    (0.0-0.1)  K/uL


 


Nucleated RBC %  0.0    /100WBC


 


Nucleated RBCs #  0    K/uL


 


ESR    23 H  (0-19)  mm/hr


 


Sodium   139   (136-146)  mmol/L


 


Potassium   3.9   (3.5-5.1)  mmol/L


 


Chloride   111 H   ()  mmol/L


 


Carbon Dioxide   20 L   (21-31)  mmol/L


 


BUN   7   (6.0-23.0)  mg/dL


 


Creatinine   0.7   (0.6-1.5)  mg/dL


 


Est Cr Clr Drug Dosing   107.57   mL/min


 


Estimated GFR (MDRD)   > 60.0   ml/min


 


Glucose   81   ()  mg/dL


 


Calcium   8.5 L   (8.8-10.8)  mg/dL


 


C-Reactive Protein     (0.0-0.5)  mg/dL














  09/08/17 Range/Units





  05:13 


 


WBC   (4.0-11.0)  K/uL


 


RBC   (4.30-5.90)  M/uL


 


Hgb   (12.0-16.0)  g/dL


 


Hct   (36.0-46.0)  %


 


MCV   (80.0-98.0)  fL


 


MCH   (27.0-32.0)  pg


 


MCHC   (31.0-37.0)  g/dL


 


RDW Std Deviation   (28.0-62.0)  fl


 


RDW Coeff of Gali   (11.0-15.0)  %


 


Plt Count   (150-400)  K/uL


 


MPV   (7.40-12.00)  fL


 


Neut % (Auto)   (48.0-80.0)  %


 


Lymph % (Auto)   (16.0-40.0)  %


 


Mono % (Auto)   (0.0-15.0)  %


 


Eos % (Auto)   (0.0-7.0)  %


 


Baso % (Auto)   (0.0-1.5)  %


 


Neut # (Auto)   (1.4-5.7)  K/uL


 


Lymph # (Auto)   (0.6-2.4)  K/uL


 


Mono # (Auto)   (0.0-0.8)  K/uL


 


Eos # (Auto)   (0.0-0.7)  K/uL


 


Baso # (Auto)   (0.0-0.1)  K/uL


 


Nucleated RBC %   /100WBC


 


Nucleated RBCs #   K/uL


 


ESR   (0-19)  mm/hr


 


Sodium   (136-146)  mmol/L


 


Potassium   (3.5-5.1)  mmol/L


 


Chloride   ()  mmol/L


 


Carbon Dioxide   (21-31)  mmol/L


 


BUN   (6.0-23.0)  mg/dL


 


Creatinine   (0.6-1.5)  mg/dL


 


Est Cr Clr Drug Dosing   mL/min


 


Estimated GFR (MDRD)   ml/min


 


Glucose   ()  mg/dL


 


Calcium   (8.8-10.8)  mg/dL


 


C-Reactive Protein  1.41 H  (0.0-0.5)  mg/dL











Med Orders - Current: 


 Current Medications





Hydrocodone Bitart/Acetaminophen (Norco 325-5 Mg)  1 - 2 tab PO Q4H PRN


   PRN Reason: Pain (moderate 4-6)


   Last Admin: 09/08/17 11:55 Dose:  2 tab


Hydromorphone HCl (Dilaudid)  1 mg IVPUSH Q1H PRN


   PRN Reason: Abdominal Pain


   Last Admin: 09/08/17 10:05 Dose:  1 mg


Lactated Ringer's (Ringers, Lactated)  1,000 mls @ 125 mls/hr IV ASDIRECTED [facility]


   Last Admin: 09/08/17 11:46 Dose:  125 mls/hr


Metoclopramide HCl (Reglan)  10 mg IVPUSH Q6H [facility]


   Last Admin: 09/08/17 06:31 Dose:  10 mg


Ondansetron HCl (Zofran)  4 mg IVPUSH Q6H PRN


   PRN Reason: Nausea/Vomiting


   Last Admin: 09/08/17 10:05 Dose:  4 mg





Discontinued Medications





Sodium Chloride (Normal Saline)  1,000 mls @ 999 mls/hr IV STAT ONE


   Stop: 09/07/17 16:49


   Last Admin: 09/07/17 16:12 Dose:  999 mls/hr


Prochlorperazine Edisylate 10 (mg/ Sodium Chloride)  52 mls @ 150 mls/hr IV 

ONETIME ONE


   Stop: 09/07/17 16:45


   Last Admin: 09/07/17 17:02 Dose:  150 mls/hr


Sodium Chloride (Normal Saline)  1,000 mls @ 999 mls/hr IV .Bolus ONE


   Stop: 09/07/17 19:17


   Last Admin: 09/07/17 18:20 Dose:  999 mls/hr


Iopamidol (Isovue Multipack-370 (76%))  70 ml IVPUSH ONETIME STA


   Stop: 09/07/17 16:49


   Last Admin: 09/07/17 16:48 Dose:  70 ml


Lorazepam (Ativan)  2 mg IVPUSH ONETIME ONE


   Stop: 09/07/17 16:24


   Last Admin: 09/07/17 16:44 Dose:  2 mg


Morphine Sulfate (Morphine)  2 mg IV ONETIME ONE


   Stop: 09/07/17 15:50


   Last Admin: 09/07/17 16:03 Dose:  2 mg


Morphine Sulfate (Morphine)  0 mg IVPUSH Q1H PRN


   PRN Reason: Pain (severe 7-10)


Ondansetron HCl (Zofran)  4 mg IVPUSH ONETIME ONE


   Stop: 09/07/17 15:50


   Last Admin: 09/07/17 16:03 Dose:  4 mg











- Exam


Quality Assessment: No: Supplemental Oxygen


General: Alert, Oriented, Mild Distress


HEENT: Pupils Equal, Pupils Reactive


Neck: Supple, Trachea Midline


Lungs: Clear to Auscultation


Cardiovascular: Regular Rate, Regular Rhythm


GI/Abdominal Exam: Normal Bowel Sounds, Soft, No Distention, Tender (Right 

lower quadrant to deep palpation)


Extremities: Normal Inspection, No Pedal Edema


Skin: Warm, Dry, Intact


Wound/Incisions: Healing Well





Consult PN Assessment/Plan


Procedures: 


Procedures





ASSAY OF AMYLASE (08/27/17)


ASSAY OF LIPASE (08/30/17)


ASSAY OF PROGESTERONE (06/24/16)


BLOOD TYPING SEROLOGIC ABO (06/24/16)


BLOOD TYPING SEROLOGIC RH(D) (06/24/16)


CHEST X-RAY 1 VIEW FRONTAL (09/17/15)


CHORIONIC GONADOTROPIN ASSAY (08/30/17)


CHORIONIC GONADOTROPIN TEST (07/21/16)


COMPLETE CBC W/AUTO DIFF WBC (08/30/17)


COMPREHEN METABOLIC PANEL (08/30/17)


CT ABD & PELV W/CONTRAST (08/30/17)


DRUG TEST PRSMV DIR OPT OBS (07/25/17)


ECHO EXAM OF ABDOMEN (08/27/17)


ELECTROCARDIOGRAM TRACING (07/25/17)


EMERGENCY DEPT VISIT (08/27/17)


EMERGENCY DEPT VISIT (07/04/17)


EMERGENCY DEPT VISIT (11/12/15)


EMERGENCY DEPT VISIT (09/17/15)


EMERGENCY DEPT VISIT (08/08/15)


HEPATOBIL SYST IMAGE W/DRUG (07/27/17)


HYDRATE IV INFUSION ADD-ON (08/27/17)


LAPAROSCOPY APPENDECTOMY (08/30/17)


RBC ANTIBODY SCREEN (06/24/16)


ROUTINE VENIPUNCTURE (08/30/17)


SMEAR WET MOUNT SALINE/INK (06/24/16)


THER/PROPH/DIAG INJ IV PUSH (08/27/17)


THER/PROPH/DIAG INJ SC/IM (07/26/17)


TISSUE EXAM BY PATHOLOGIST (08/30/17)


TX/PRO/DX INJ NEW DRUG ADDON (08/27/17)


TX/PRO/DX INJ SAME DRUG ADON (08/08/15)


URINALYSIS AUTO W/SCOPE (08/27/17)


URINE CULTURE/COLONY COUNT (09/17/15)


URINE PREGNANCY TEST (08/27/17)








(1) Right lower quadrant abdominal pain


SNOMED Code(s): 593431891


   Code(s): R10.31 - RIGHT LOWER QUADRANT PAIN   Priority: High   Current Visit

: Yes   





(2) Anxiety


SNOMED Code(s): 31326693


   Code(s): F41.9 - ANXIETY DISORDER, UNSPECIFIED   Priority: High   Current 

Visit: Yes   


Problem List Initiated/Reviewed/Updated: Yes


Plan: 





The patient is a 22-year-old lady who is having a rather prolonged reaction to 

her appendectomy. There is some indication of edema at the terminal ileum on CT 

scan without evidence of inflammation as reported by surgeon. The patient does 

have a ESR which is elevated at 23 mm/h along with an elevated CRP. These can 

be likely secondary to the recent surgery. She does not have specific 

indications of infection as her white cell count is normal. The distribution of 

her white blood cells is also normal. I do not feel that antibiotics would be 

appropriate at this time. Treating her anxiety is appropriate and I've ordered 

Xanax 0.25 mg by mouth 3 times a day when necessary anxiety. I will follow-up 

with patient on a daily basis.





I like to thank Dr. Fonseca for participation in care of his patient.

## 2017-09-08 NOTE — CT
EXAM DATE: 17



PATIENT'S AGE: 22



: CINDY VELOZ



Facility: [place], [place]

Patient ID: [id number]

Site Patient ID: [id number].

Site Accession #: [id number].

: 1995

Study: CT Abdomen/Pelvis [id number]/7/2017 6:11:53 PM

Ordering Physician: Doctor Temp



Final Report: 

HISTORY:

Abdominal pain. Status post appy 2 weeks prior.



TECHNIQUE:

The abdomen and pelvis were scanned using helical technique at 3 mm after 70 cc 
of Isovue-370. Oral contrast administered. Sagittal and coronal reconstructions 
were performed.



COMPARISON:

2017.



FINDINGS:

Lung bases: No infiltrate.



Liver and gallbladder: The liver parenchyma is homogeneous. There is a 1.6 cm 
area of hypodensity seen the medial segment of the liver near the falciform 
ligament most likely focal fatty infiltration. No calcified gallstones.



Spleen, pancreas and adrenal glands: Unremarkable.



Kidneys and bladder: Symmetric nephrograms without hydronephrosis. The bladder 
is within normal limits.

Retroperitoneum and lymph nodes: The abdominal aorta is normal as normal 
caliber. No pathologic zahc aortic lymphadenopathy is seen. There is multiple 
small mesenteric lymph nodes seen in the right lower quadrant increased from 
prior exam. They have a short axis diameter of less than 10 mm.



GI tract: A small amount of fluid is seen within the stomach. Oral contrast is 
seen in the distal small bowel. No dilated loops are seen. There is wall 
thickening of the terminal ileum. There is wall thickening of the cecum with 
inflammatory change seen in the right lower quadrant. The appendix is not 
identified. No related fluid collection or abscess is seen. There is filling 
gas seen in the remainder of the colon. There is a small amount of free fluid 
the pelvis. No free air. 



Pelvic organs: The uterus and adnexa are within normal limits.



Osseous structures: Normal for age.



IMPRESSION:

1. There is wall thickening of the terminal ileum and cecum with large amount 
of inflammatory change seen in the right lower quadrant between the two. 
Multiple small mesenteric lymph nodes are present. The appendix is not 
visualized. No loculated fluid collection or abscess is appreciated.

2. Small amount of free fluid in the pelvis.



Dictated by Yuliet Soni MD @ 2017 6:56:14 PM





Dictated by: Yuliet Soni MD @ 2017 18:58:26

(Electronic Signature)



 Bottom of Form 1

Report Signed by Proxy.
MTDD

## 2017-09-08 NOTE — PCM.SURGPN
- General Info


Date of Service: 09/08/17


Post-Op Diagnosis: Early appendicitis, now with thickening of terminal ileum 

and cecum


Admission Diagnosis/Problem: Abdominal pain


Functional Status: Reports: Ambulating





- Review of Systems


General: Reports: Weakness.  Denies: Fever, Fatigue, Malaise


HEENT: Reports: No Symptoms


Pulmonary: Denies: Shortness of Breath


Cardiovascular: Denies: Chest Pain


Gastrointestinal: Reports: Abdominal Pain, Decreased Appetite, Flatus, Nausea, 

Vomiting.  Denies: Constipation, Diarrhea, Hematochezia, Melena


Genitourinary: Reports: No Symptoms


Musculoskeletal: Reports: No Symptoms


Skin: Reports: No Symptoms


Neurological: Reports: No Symptoms


Psychiatric: Reports: No Symptoms





- Patient Data


Vitals - Most Recent: 


 Last Vital Signs











Temp  98.2 F   09/08/17 14:31


 


Pulse  70   09/08/17 14:31


 


Resp  16   09/08/17 14:31


 


BP  111/68   09/08/17 14:31


 


Pulse Ox  97   09/08/17 14:31











Weight - Most Recent: 118 lb 2.684 oz


I&O - Last 24 Hours: 


 Intake & Output











 09/08/17 09/08/17 09/08/17





 03:59 11:59 19:59


 


Intake Total  1025 1748


 


Output Total  650 940


 


Balance  375 808











Lab Results Last 24 Hrs: 


 Laboratory Results - last 24 hr











  09/08/17 09/08/17 09/08/17 Range/Units





  05:13 05:13 05:13 


 


WBC  7.60    (4.0-11.0)  K/uL


 


RBC  3.79 L    (4.30-5.90)  M/uL


 


Hgb  11.5 L    (12.0-16.0)  g/dL


 


Hct  33.7 L    (36.0-46.0)  %


 


MCV  88.9    (80.0-98.0)  fL


 


MCH  30.3    (27.0-32.0)  pg


 


MCHC  34.1    (31.0-37.0)  g/dL


 


RDW Std Deviation  47.2    (28.0-62.0)  fl


 


RDW Coeff of Gali  15    (11.0-15.0)  %


 


Plt Count  263    (150-400)  K/uL


 


MPV  9.10    (7.40-12.00)  fL


 


Neut % (Auto)  68.5    (48.0-80.0)  %


 


Lymph % (Auto)  22.6    (16.0-40.0)  %


 


Mono % (Auto)  7.8    (0.0-15.0)  %


 


Eos % (Auto)  0.8    (0.0-7.0)  %


 


Baso % (Auto)  0.3    (0.0-1.5)  %


 


Neut # (Auto)  5.2    (1.4-5.7)  K/uL


 


Lymph # (Auto)  1.7    (0.6-2.4)  K/uL


 


Mono # (Auto)  0.6    (0.0-0.8)  K/uL


 


Eos # (Auto)  0.1    (0.0-0.7)  K/uL


 


Baso # (Auto)  0.0    (0.0-0.1)  K/uL


 


Nucleated RBC %  0.0    /100WBC


 


Nucleated RBCs #  0    K/uL


 


ESR    23 H  (0-19)  mm/hr


 


Sodium   139   (136-146)  mmol/L


 


Potassium   3.9   (3.5-5.1)  mmol/L


 


Chloride   111 H   ()  mmol/L


 


Carbon Dioxide   20 L   (21-31)  mmol/L


 


BUN   7   (6.0-23.0)  mg/dL


 


Creatinine   0.7   (0.6-1.5)  mg/dL


 


Est Cr Clr Drug Dosing   107.57   mL/min


 


Estimated GFR (MDRD)   > 60.0   ml/min


 


Glucose   81   ()  mg/dL


 


Calcium   8.5 L   (8.8-10.8)  mg/dL


 


C-Reactive Protein     (0.0-0.5)  mg/dL














  09/08/17 Range/Units





  05:13 


 


WBC   (4.0-11.0)  K/uL


 


RBC   (4.30-5.90)  M/uL


 


Hgb   (12.0-16.0)  g/dL


 


Hct   (36.0-46.0)  %


 


MCV   (80.0-98.0)  fL


 


MCH   (27.0-32.0)  pg


 


MCHC   (31.0-37.0)  g/dL


 


RDW Std Deviation   (28.0-62.0)  fl


 


RDW Coeff of Gali   (11.0-15.0)  %


 


Plt Count   (150-400)  K/uL


 


MPV   (7.40-12.00)  fL


 


Neut % (Auto)   (48.0-80.0)  %


 


Lymph % (Auto)   (16.0-40.0)  %


 


Mono % (Auto)   (0.0-15.0)  %


 


Eos % (Auto)   (0.0-7.0)  %


 


Baso % (Auto)   (0.0-1.5)  %


 


Neut # (Auto)   (1.4-5.7)  K/uL


 


Lymph # (Auto)   (0.6-2.4)  K/uL


 


Mono # (Auto)   (0.0-0.8)  K/uL


 


Eos # (Auto)   (0.0-0.7)  K/uL


 


Baso # (Auto)   (0.0-0.1)  K/uL


 


Nucleated RBC %   /100WBC


 


Nucleated RBCs #   K/uL


 


ESR   (0-19)  mm/hr


 


Sodium   (136-146)  mmol/L


 


Potassium   (3.5-5.1)  mmol/L


 


Chloride   ()  mmol/L


 


Carbon Dioxide   (21-31)  mmol/L


 


BUN   (6.0-23.0)  mg/dL


 


Creatinine   (0.6-1.5)  mg/dL


 


Est Cr Clr Drug Dosing   mL/min


 


Estimated GFR (MDRD)   ml/min


 


Glucose   ()  mg/dL


 


Calcium   (8.8-10.8)  mg/dL


 


C-Reactive Protein  1.41 H  (0.0-0.5)  mg/dL











Med Orders - Current: 


 Current Medications





Hydrocodone Bitart/Acetaminophen (Norco 325-5 Mg)  1 - 2 tab PO Q4H PRN


   PRN Reason: Pain (moderate 4-6)


   Last Admin: 09/08/17 11:55 Dose:  2 tab


Alprazolam (Xanax)  0.25 mg PO Q8H PRN


   PRN Reason: Anxiety


   Last Admin: 09/08/17 14:32 Dose:  0.25 mg


Hydromorphone HCl (Dilaudid)  1 mg IVPUSH Q1H PRN


   PRN Reason: Abdominal Pain


   Last Admin: 09/08/17 15:45 Dose:  1 mg


Lactated Ringer's (Ringers, Lactated)  1,000 mls @ 125 mls/hr IV ASDIRECTED [facility]


   Last Admin: 09/08/17 11:46 Dose:  125 mls/hr


Metoclopramide HCl (Reglan)  10 mg IVPUSH Q6H [facility]


   Last Admin: 09/08/17 13:22 Dose:  10 mg


Ondansetron HCl (Zofran)  4 mg IVPUSH Q6H PRN


   PRN Reason: Nausea/Vomiting


   Last Admin: 09/08/17 16:31 Dose:  4 mg





Discontinued Medications





Sodium Chloride (Normal Saline)  1,000 mls @ 999 mls/hr IV STAT ONE


   Stop: 09/07/17 16:49


   Last Admin: 09/07/17 16:12 Dose:  999 mls/hr


Prochlorperazine Edisylate 10 (mg/ Sodium Chloride)  52 mls @ 150 mls/hr IV 

ONETIME ONE


   Stop: 09/07/17 16:45


   Last Admin: 09/07/17 17:02 Dose:  150 mls/hr


Sodium Chloride (Normal Saline)  1,000 mls @ 999 mls/hr IV .Bolus ONE


   Stop: 09/07/17 19:17


   Last Admin: 09/07/17 18:20 Dose:  999 mls/hr


Iopamidol (Isovue Multipack-370 (76%))  70 ml IVPUSH ONETIME STA


   Stop: 09/07/17 16:49


   Last Admin: 09/07/17 16:48 Dose:  70 ml


Lorazepam (Ativan)  2 mg IVPUSH ONETIME ONE


   Stop: 09/07/17 16:24


   Last Admin: 09/07/17 16:44 Dose:  2 mg


Morphine Sulfate (Morphine)  2 mg IV ONETIME ONE


   Stop: 09/07/17 15:50


   Last Admin: 09/07/17 16:03 Dose:  2 mg


Morphine Sulfate (Morphine)  0 mg IVPUSH Q1H PRN


   PRN Reason: Pain (severe 7-10)


Ondansetron HCl (Zofran)  4 mg IVPUSH ONETIME ONE


   Stop: 09/07/17 15:50


   Last Admin: 09/07/17 16:03 Dose:  4 mg











- Exam


Wound/Incisions: Healing Well.  No: Drainage, Erythema


General: Alert, Oriented, Cooperative, Mild Distress


HEENT: Pupils Equal, Pupils Reactive.  No: Scleral Icterus


Neck: Supple


Lungs: Clear to Auscultation, Normal Respiratory Effort


Cardiovascular: Regular Rate, Regular Rhythm.  No: Tachycardia


GI/Abdominal Exam: Normal Bowel Sounds, Soft, No Distention, No Mass, Tender (

RLQ)


Extremities: Normal Inspection


Skin: Warm, Dry


Neurological: No New Focal Deficit


Psy/Mental Status: Alert, Anxious





- Problem List & Annotations


(1) Abdominal pain


SNOMED Code(s): 67918538


   Code(s): R10.9 - UNSPECIFIED ABDOMINAL PAIN   Status: Acute   Priority: High

   Current Visit: Yes   





(2) Anxiety


SNOMED Code(s): 39478060


   Code(s): F41.9 - ANXIETY DISORDER, UNSPECIFIED   Status: Acute   Priority: 

High   Current Visit: Yes   





- Problem List Review


Problem List Initiated/Reviewed/Updated: Yes





- My Orders


Last 24 Hours: 


 Active Orders 24 hr











 Category Date Time Status


 


 Antiembolic Devices [RC] PER UNIT ROUTINE Care  09/07/17 19:29 Active


 


 Notify Provider Consults [RC] ASDIRECTED Care  09/07/17 19:41 Active


 


 Up ad Nikki [RC] PER UNIT ROUTINE Care  09/07/17 19:28 Active


 


 Vital Signs [RC] PER UNIT ROUTINE Care  09/07/17 19:28 Active


 


 Consult to Physician [CONS] Routine Cons  09/07/17 19:40 Active


 


 Full Liquid Diet [DIET] Diet  09/08/17 Breakfast Active


 


 ALPRAZolam [Xanax] Med  09/08/17 14:04 Active





 0.25 mg PO Q8H PRN   


 


 Acetaminophen/HYDROcodone [Norco 325-5 MG] Med  09/07/17 19:29 Active





 1 - 2 tab PO Q4H PRN   


 


 HYDROmorphone [Dilaudid] Med  09/07/17 19:42 Active





 1 mg IVPUSH Q1H PRN   


 


 Lactated Ringers [Ringers, Lactated] 1,000 ml Med  09/07/17 19:45 Active





 IV ASDIRECTED   


 


 Metoclopramide [Reglan] Med  09/07/17 19:30 Active





 10 mg IVPUSH Q6H   


 


 Ondansetron [Zofran] Med  09/07/17 19:29 Active





 4 mg IVPUSH Q6H PRN   


 


 Sequential Compression Device [OM.PC] Routine Oth  09/07/17 19:29 Ordered


 


 Resuscitation Status Routine Resus Stat  09/07/17 19:29 Ordered








 Medication Orders





Hydrocodone Bitart/Acetaminophen (Norco 325-5 Mg)  1 - 2 tab PO Q4H PRN


   PRN Reason: Pain (moderate 4-6)


   Last Admin: 09/08/17 11:55  Dose: 2 tab


   Admin: 09/08/17 03:52  Dose: 2 tab


Alprazolam (Xanax)  0.25 mg PO Q8H PRN


   PRN Reason: Anxiety


   Last Admin: 09/08/17 14:32  Dose: 0.25 mg


Hydromorphone HCl (Dilaudid)  1 mg IVPUSH Q1H PRN


   PRN Reason: Abdominal Pain


   Last Admin: 09/08/17 15:45  Dose: 1 mg


   Admin: 09/08/17 14:33  Dose: 1 mg


   Admin: 09/08/17 13:22  Dose: 1 mg


   Admin: 09/08/17 10:05  Dose: 1 mg


   Admin: 09/07/17 20:27  Dose: 1 mg


Lactated Ringer's (Ringers, Lactated)  1,000 mls @ 125 mls/hr IV ASDIRECTED [facility]


   Last Admin: 09/08/17 11:46  Dose: 125 mls/hr


   Infusion: 09/08/17 11:46  Dose: 125 mls/hr


   Admin: 09/08/17 03:47  Dose: 125 mls/hr


   Infusion: 09/08/17 03:39  Dose: 125 mls/hr


   Admin: 09/07/17 19:39  Dose: 125 mls/hr


Metoclopramide HCl (Reglan)  10 mg IVPUSH Q6H [facility]


   Last Admin: 09/08/17 13:22  Dose: 10 mg


   Admin: 09/08/17 06:31  Dose: 10 mg


   Admin: 09/08/17 00:35  Dose: 10 mg


   Admin: 09/07/17 19:58  Dose: 10 mg


Ondansetron HCl (Zofran)  4 mg IVPUSH Q6H PRN


   PRN Reason: Nausea/Vomiting


   Last Admin: 09/08/17 16:31  Dose: 4 mg


   Admin: 09/08/17 10:05  Dose: 4 mg











- Assessment


Assessment           (Free Text/Narrative):: 





Appreciate Dr. Pardo' consultation.


Suspect this may be nonspecific terminal ileitis/colitis.  No abscess.





- Plan


Plan                        (Free Text/Narrative):: 





Continue conservative therapy with IV fluids, bowel rest and clear/full liquids.

## 2017-09-09 RX ADMIN — METHYLPREDNISOLONE SODIUM SUCCINATE SCH MG: 125 INJECTION, POWDER, FOR SOLUTION INTRAMUSCULAR; INTRAVENOUS at 08:52

## 2017-09-09 RX ADMIN — METHYLPREDNISOLONE SODIUM SUCCINATE SCH MG: 125 INJECTION, POWDER, FOR SOLUTION INTRAMUSCULAR; INTRAVENOUS at 00:23

## 2017-09-09 RX ADMIN — ONDANSETRON PRN MG: 2 INJECTION, SOLUTION INTRAMUSCULAR; INTRAVENOUS at 09:04

## 2017-09-09 RX ADMIN — HYDROMORPHONE HCL-SODIUM CHLORIDE 0.9% INJ 6 MG/30ML SCH MG: 0.2 SOLUTION at 10:45

## 2017-09-09 RX ADMIN — SODIUM CHLORIDE SCH MLS/HR: 9 INJECTION, SOLUTION INTRAMUSCULAR; INTRAVENOUS; SUBCUTANEOUS at 08:52

## 2017-09-09 RX ADMIN — METHYLPREDNISOLONE SODIUM SUCCINATE SCH MG: 125 INJECTION, POWDER, FOR SOLUTION INTRAMUSCULAR; INTRAVENOUS at 18:56

## 2017-09-09 RX ADMIN — SODIUM CHLORIDE SCH MLS/HR: 9 INJECTION, SOLUTION INTRAMUSCULAR; INTRAVENOUS; SUBCUTANEOUS at 20:08

## 2017-09-09 NOTE — PCM.SURGPN
- General Info


Date of Service: 09/09/17


Post-Op Diagnosis: s/p lap appy for early appendicitis, now has inflammatory 

changes in the terminal ileum and cecum.  No evidence of abscess.


Functional Status: Denies: Tolerating Diet


Pain Score: 8





- Review of Systems


General: Denies: Fever, Appetite


HEENT: Reports: No Symptoms


Pulmonary: Reports: No Symptoms


Cardiovascular: Reports: No Symptoms


Gastrointestinal: Reports: Abdominal Pain (still in RLQ), Decreased Appetite, 

Flatus, Vomiting.  Denies: Diarrhea, Nausea (has improved in last 12 hours)


Genitourinary: Reports: No Symptoms


Musculoskeletal: Reports: No Symptoms


Skin: Reports: No Symptoms


Neurological: Reports: No Symptoms


Psychiatric: Reports: Anxiety





- Patient Data


Vitals - Most Recent: 


 Last Vital Signs











Temp  97.3 F   09/09/17 04:00


 


Pulse  63   09/09/17 04:00


 


Resp  16   09/09/17 04:00


 


BP  125/62   09/09/17 04:00


 


Pulse Ox  97   09/09/17 04:00











Weight - Most Recent: 118 lb 2.684 oz


I&O - Last 24 Hours: 


 Intake & Output











 09/08/17 09/09/17 09/09/17





 19:59 03:59 11:59


 


Intake Total 2758 1000 500


 


Output Total 940  1980


 


Balance 1818 1000 -1480











Lab Results Last 24 Hrs: 


 Laboratory Results - last 24 hr











  09/08/17 09/08/17 Range/Units





  05:13 05:13 


 


ESR  23 H   (0-19)  mm/hr


 


C-Reactive Protein   1.41 H  (0.0-0.5)  mg/dL











Med Orders - Current: 


 Current Medications





Alprazolam (Xanax)  0.25 mg PO Q8H PRN


   PRN Reason: Anxiety


   Last Admin: 09/09/17 00:30 Dose:  0.25 mg


Hydromorphone HCl (Dilaudid Pca 6 Mg In Ns 30 Ml)  6 mg IV ASDIRECTED KEL


   PRN Reason: Protocol


   Last Admin: 09/08/17 17:48 Dose:  6 mg


Lactated Ringer's (Ringers, Lactated)  1,000 mls @ 125 mls/hr IV ASDIRECTED KEL


   Last Admin: 09/09/17 03:47 Dose:  125 mls/hr


Pantoprazole Sodium 40 mg/ (Sodium Chloride)  10 mls @ 300 mls/hr IVPUSH BID KEL


Methylprednisolone Sodium Succinate (Solu-Medrol)  125 mg IVPUSH Q8H [facility]


   Last Admin: 09/09/17 00:23 Dose:  125 mg


Metoclopramide HCl (Reglan)  10 mg IVPUSH Q6H K[facility]


   Last Admin: 09/09/17 06:37 Dose:  10 mg


Ondansetron HCl (Zofran)  8 mg IVPUSH Q4H PRN


   PRN Reason: Nausea/Vomiting


   Last Admin: 09/08/17 20:13 Dose:  8 mg





Discontinued Medications





Hydrocodone Bitart/Acetaminophen (Norco 325-5 Mg)  1 - 2 tab PO Q4H PRN


   PRN Reason: Pain (moderate 4-6)


   Last Admin: 09/08/17 11:55 Dose:  2 tab


Hydromorphone HCl (Dilaudid)  1 mg IVPUSH Q1H PRN


   PRN Reason: Abdominal Pain


   Last Admin: 09/08/17 16:39 Dose:  1 mg


Hydromorphone HCl (Dilaudid Pca 6 Mg In Ns 30 Ml)  6 mg IV ASDIRECTED PRN; 

Protocol


   PRN Reason: Abdominal Pain


Sodium Chloride (Normal Saline)  1,000 mls @ 999 mls/hr IV STAT ONE


   Stop: 09/07/17 16:49


   Last Admin: 09/07/17 16:12 Dose:  999 mls/hr


Prochlorperazine Edisylate 10 (mg/ Sodium Chloride)  52 mls @ 150 mls/hr IV 

ONETIME ONE


   Stop: 09/07/17 16:45


   Last Admin: 09/07/17 17:02 Dose:  150 mls/hr


Sodium Chloride (Normal Saline)  1,000 mls @ 999 mls/hr IV .Bolus ONE


   Stop: 09/07/17 19:17


   Last Admin: 09/07/17 18:20 Dose:  999 mls/hr


Pantoprazole Sodium 40 mg/ (Sodium Chloride)  10 mls @ 300 mls/hr IVPUSH Q10H 

[facility]


   Last Admin: 09/08/17 17:47 Dose:  300 mls/hr


Iopamidol (Isovue Multipack-370 (76%))  70 ml IVPUSH ONETIME STA


   Stop: 09/07/17 16:49


   Last Admin: 09/07/17 16:48 Dose:  70 ml


Lorazepam (Ativan)  2 mg IVPUSH ONETIME ONE


   Stop: 09/07/17 16:24


   Last Admin: 09/07/17 16:44 Dose:  2 mg


Morphine Sulfate (Morphine)  2 mg IV ONETIME ONE


   Stop: 09/07/17 15:50


   Last Admin: 09/07/17 16:03 Dose:  2 mg


Morphine Sulfate (Morphine)  0 mg IVPUSH Q1H PRN


   PRN Reason: Pain (severe 7-10)


Ondansetron HCl (Zofran)  4 mg IVPUSH ONETIME ONE


   Stop: 09/07/17 15:50


   Last Admin: 09/07/17 16:03 Dose:  4 mg


Ondansetron HCl (Zofran)  4 mg IVPUSH Q6H PRN


   PRN Reason: Nausea/Vomiting


   Last Admin: 09/08/17 16:31 Dose:  4 mg


Prednisone (Prednisone)  40 mg PO WITHBREAKFAST KEL


   Last Admin: 09/08/17 18:22 Dose:  Not Given











- Exam


Wound/Incisions: Healing Well, No Drainage


General: Alert, Oriented, Cooperative, Moderate Distress


HEENT: Pupils Equal, Pupils Reactive


Neck: Supple


Lungs: Clear to Auscultation, Normal Respiratory Effort


Cardiovascular: Regular Rate, Regular Rhythm, No Murmurs.  No: Tachycardia


GI/Abdominal Exam: Normal Bowel Sounds, Soft, No Distention, No Mass, Tender (

RLQ).  No: Guarding, Rigid, Rebound


Extremities: Normal Inspection


Skin: Warm, Dry, Intact


Neurological: No New Focal Deficit


Psy/Mental Status: Alert, Normal Affect, Anxious





- Problem List & Annotations


(1) Abdominal pain


SNOMED Code(s): 95016327


   Code(s): R10.9 - UNSPECIFIED ABDOMINAL PAIN   Status: Acute   Priority: High

   Current Visit: Yes   





(2) Anxiety


SNOMED Code(s): 86174886


   Code(s): F41.9 - ANXIETY DISORDER, UNSPECIFIED   Status: Acute   Priority: 

High   Current Visit: Yes   





(3) Ileitis, terminal


SNOMED Code(s): 748876769


   Code(s): K50.00 - CROHN'S DISEASE OF SMALL INTESTINE WITHOUT COMPLICATIONS   

Status: Acute   Priority: Medium   Current Visit: Yes   Annotation/Comment:: No 

prior history of Crohn's disease or hematochezia.


Diagnosis suspected based on CT report of inflammatory changes in terminal 

ileum and cecum.   


Qualifiers: 


   Digestive disease complication type: without complication   Qualified Code(s)

: K50.00 - Crohn's disease of small intestine without complications   





- Problem List Review


Problem List Initiated/Reviewed/Updated: Yes





- My Orders


Last 24 Hours: 


 Active Orders 24 hr











 Category Date Time Status


 


 ALPRAZolam [Xanax] Med  09/08/17 14:04 Active





 0.25 mg PO Q8H PRN   


 


 HYDROmorphone/Normal Saline [Dilaudid PCA 6 MG in NS 30 Med  09/08/17 17:45 

Active





 ML]   





 6 mg IV ASDIRECTED   


 


 Ondansetron [Zofran] Med  09/08/17 17:02 Active





 8 mg IVPUSH Q4H PRN   


 


 Pantoprazole [ProTONIX IV***] 40 mg Med  09/09/17 09:00 Active





 Sodium Chloride 0.9% [Normal Saline] 10 ml   





 IVPUSH BID   


 


 methylPREDNISolone Sod Succ [Solu-MEDROL] Med  09/08/17 17:15 Active





 125 mg IVPUSH Q8H   








 Medication Orders





Alprazolam (Xanax)  0.25 mg PO Q8H PRN


   PRN Reason: Anxiety


   Last Admin: 09/09/17 00:30  Dose: 0.25 mg


   Admin: 09/08/17 14:32  Dose: 0.25 mg


Hydromorphone HCl (Dilaudid Pca 6 Mg In Ns 30 Ml)  6 mg IV ASDIRECTED KEL


   PRN Reason: Protocol


   Last Admin: 09/08/17 17:48  Dose: 6 mg


Lactated Ringer's (Ringers, Lactated)  1,000 mls @ 125 mls/hr IV ASDIRECTED EKL


   Last Admin: 09/09/17 03:47  Dose: 125 mls/hr


   Infusion: 09/09/17 03:47  Dose: 125 mls/hr


   Admin: 09/08/17 19:58  Dose: 125 mls/hr


   Infusion: 09/08/17 19:46  Dose: 125 mls/hr


   Admin: 09/08/17 11:46  Dose: 125 mls/hr


   Infusion: 09/08/17 11:46  Dose: 125 mls/hr


   Admin: 09/08/17 03:47  Dose: 125 mls/hr


   Infusion: 09/08/17 03:39  Dose: 125 mls/hr


   Admin: 09/07/17 19:39  Dose: 125 mls/hr


Pantoprazole Sodium 40 mg/ (Sodium Chloride)  10 mls @ 300 mls/hr IVPUSH BID [facility]


Methylprednisolone Sodium Succinate (Solu-Medrol)  125 mg IVPUSH Q8H [facility]


   Last Admin: 09/09/17 00:23  Dose: 125 mg


   Admin: 09/08/17 17:48  Dose: 125 mg


Metoclopramide HCl (Reglan)  10 mg IVPUSH Q6H [facility]


   Last Admin: 09/09/17 06:37  Dose: 10 mg


   Admin: 09/09/17 00:31  Dose: 10 mg


   Admin: 09/08/17 18:47  Dose: 10 mg


   Admin: 09/08/17 13:22  Dose: 10 mg


   Admin: 09/08/17 06:31  Dose: 10 mg


   Admin: 09/08/17 00:35  Dose: 10 mg


   Admin: 09/07/17 19:58  Dose: 10 mg


Ondansetron HCl (Zofran)  8 mg IVPUSH Q4H PRN


   PRN Reason: Nausea/Vomiting


   Last Admin: 09/08/17 20:13  Dose: 8 mg











- Assessment


Assessment           (Free Text/Narrative):: 





Slight improvement in terms of nausea.  Still rates pain at "8" and still 

vomiting.


Appreciated Dr. Pardo' assistance and certainly agree with a trial of steroids.





- Plan


Plan                        (Free Text/Narrative):: 





Continue IV fluid support, steroids and anti-nausea medications.


May need GI consult but hopefully we can quiet this down first.


Switch to regular admit.

## 2017-09-09 NOTE — PCM.PN
- General Info


Date of Service: 09/09/17


Admission Dx/Problem (Free Text): 





Prolonged abdominal pain secondary to laparoscopic cholecystectomy


Subjective Update: 





The patient was resting, she seems to be doing better, less vomiting but has 

some nausea associated. Still with significant pain right lower quadrant. Now 

on PCA Dilaudid pump.


Functional Status: Reports: Pain Controlled.  Denies: Tolerating Diet, 

Ambulating





- Review of Systems


General: Denies: Appetite


HEENT: Reports: No Symptoms


Pulmonary: Reports: No Symptoms


Cardiovascular: Reports: No Symptoms


Gastrointestinal: Reports: Abdominal Pain, Nausea, Vomiting


Genitourinary: Reports: No Symptoms


Musculoskeletal: Reports: No Symptoms


Skin: Reports: No Symptoms


Neurological: Reports: No Symptoms


Psychiatric: Reports: No Symptoms





- Patient Data


Vitals - Most Recent: 


 Last Vital Signs











Temp  36.4 C   09/09/17 08:00


 


Pulse  57 L  09/09/17 08:00


 


Resp  14   09/09/17 08:00


 


BP  120/74   09/09/17 08:00


 


Pulse Ox  96   09/09/17 08:00











Weight - Most Recent: 53.6 kg


Med Orders - Current: 


 Current Medications





Alprazolam (Xanax)  0.25 mg PO Q8H PRN


   PRN Reason: Anxiety


   Last Admin: 09/09/17 00:30 Dose:  0.25 mg


Hydromorphone HCl (Dilaudid Pca 6 Mg In Ns 30 Ml)  6 mg IV ASDIRECTED [facility]


   PRN Reason: Protocol


   Last Admin: 09/08/17 17:48 Dose:  6 mg


Lactated Ringer's (Ringers, Lactated)  1,000 mls @ 125 mls/hr IV ASDIRECTED [facility]


   Last Admin: 09/09/17 03:47 Dose:  125 mls/hr


Pantoprazole Sodium 40 mg/ (Sodium Chloride)  10 mls @ 300 mls/hr IVPUSH BID [facility]


   Last Admin: 09/09/17 08:52 Dose:  300 mls/hr


Methylprednisolone Sodium Succinate (Solu-Medrol)  125 mg IVPUSH Q8H [facility]


   Last Admin: 09/09/17 08:52 Dose:  125 mg


Metoclopramide HCl (Reglan)  10 mg IVPUSH Q6H [facility]


   Last Admin: 09/09/17 06:37 Dose:  10 mg


Ondansetron HCl (Zofran)  8 mg IVPUSH Q4H PRN


   PRN Reason: Nausea/Vomiting


   Last Admin: 09/09/17 09:04 Dose:  8 mg





Discontinued Medications





Hydrocodone Bitart/Acetaminophen (Norco 325-5 Mg)  1 - 2 tab PO Q4H PRN


   PRN Reason: Pain (moderate 4-6)


   Last Admin: 09/08/17 11:55 Dose:  2 tab


Hydromorphone HCl (Dilaudid)  1 mg IVPUSH Q1H PRN


   PRN Reason: Abdominal Pain


   Last Admin: 09/08/17 16:39 Dose:  1 mg


Hydromorphone HCl (Dilaudid Pca 6 Mg In Ns 30 Ml)  6 mg IV ASDIRECTED PRN; 

Protocol


   PRN Reason: Abdominal Pain


Sodium Chloride (Normal Saline)  1,000 mls @ 999 mls/hr IV STAT ONE


   Stop: 09/07/17 16:49


   Last Admin: 09/07/17 16:12 Dose:  999 mls/hr


Prochlorperazine Edisylate 10 (mg/ Sodium Chloride)  52 mls @ 150 mls/hr IV 

ONETIME ONE


   Stop: 09/07/17 16:45


   Last Admin: 09/07/17 17:02 Dose:  150 mls/hr


Sodium Chloride (Normal Saline)  1,000 mls @ 999 mls/hr IV .Bolus ONE


   Stop: 09/07/17 19:17


   Last Admin: 09/07/17 18:20 Dose:  999 mls/hr


Pantoprazole Sodium 40 mg/ (Sodium Chloride)  10 mls @ 300 mls/hr IVPUSH Q10H 

[facility]


   Last Admin: 09/08/17 17:47 Dose:  300 mls/hr


Iopamidol (Isovue Multipack-370 (76%))  70 ml IVPUSH ONETIME STA


   Stop: 09/07/17 16:49


   Last Admin: 09/07/17 16:48 Dose:  70 ml


Lorazepam (Ativan)  2 mg IVPUSH ONETIME ONE


   Stop: 09/07/17 16:24


   Last Admin: 09/07/17 16:44 Dose:  2 mg


Morphine Sulfate (Morphine)  2 mg IV ONETIME ONE


   Stop: 09/07/17 15:50


   Last Admin: 09/07/17 16:03 Dose:  2 mg


Morphine Sulfate (Morphine)  0 mg IVPUSH Q1H PRN


   PRN Reason: Pain (severe 7-10)


Ondansetron HCl (Zofran)  4 mg IVPUSH ONETIME ONE


   Stop: 09/07/17 15:50


   Last Admin: 09/07/17 16:03 Dose:  4 mg


Ondansetron HCl (Zofran)  4 mg IVPUSH Q6H PRN


   PRN Reason: Nausea/Vomiting


   Last Admin: 09/08/17 16:31 Dose:  4 mg


Prednisone (Prednisone)  40 mg PO WITHBREAKFAST KEL


   Last Admin: 09/08/17 18:22 Dose:  Not Given











- Exam


Quality Assessment: No: Supplemental Oxygen


General: Alert, Oriented, Moderate Distress


HEENT: Pupils Equal, Pupils Reactive.  No: Scleral Icterus


Neck: Supple, Trachea Midline


Lungs: Clear to Auscultation, Normal Respiratory Effort


Cardiovascular: Regular Rate, Regular Rhythm


GI/Abdominal Exam: Normal Bowel Sounds, Soft, No Distention, Tender (Primarily 

right lower quadrant).  No: Guarding, Rigid


Back Exam: Normal Inspection


Extremities: Normal Inspection, No Pedal Edema


Skin: Warm, Dry


Psy/Mental Status: Alert





- Problem List & Annotations


(1) Right lower quadrant abdominal pain


SNOMED Code(s): 291402787


   Code(s): R10.31 - RIGHT LOWER QUADRANT PAIN   Status: Acute   Priority: High

   Current Visit: Yes   





(2) Anxiety


SNOMED Code(s): 28719587


   Code(s): F41.9 - ANXIETY DISORDER, UNSPECIFIED   Status: Acute   Priority: 

High   Current Visit: Yes   





- Problem List Review


Problem List Initiated/Reviewed/Updated: Yes





- My Orders


Last 24 Hours: 


My Active Orders





09/09/17 08:28


Abdomen 2V AP Flat Upright [CR] Urgent 














- Assessment


Assessment:: 





The patient is a 22-year-old lady who is still having difficulty with right 

lower quadrant abdominal pain. She has had some nausea but vomiting is 

lessened. I have discussed with the patient concern for small bowel obstruction 

which I doubt at this point however, I have ordered a flat and upright 

abdominal plate x-ray to exclude this. I've also discussed with the patient the 

possibility of an NG tube if she still continues to vomit. The patient says 

that some of her family members want her to go to [place] and I explained that 

this may not be possible to transfer secondary to concerns for federal law. For 

now I have explained and outlined the patient's treatment plan with the patient 

and have reviewed notes from Dr. Fonseca. I'll see the patient in follow-up 

and her treatment plan will be adjusted accordingly for now she'll be kept on 

the Dilaudid PCA pump as well as Zofran to help with the nausea and vomiting. I 

have suggested to the patient that she try to avoid acidic foods or juices and 

stick primarily to water.





- Plan


Plan:: 





Patient will be admitted to OBS status for IV fluiids, pain and nausea 

management.


Hospitalist consult--Dr. Bowman, he has been notified by me.

## 2017-09-10 LAB
CHLORIDE SERPL-SCNC: 105 MMOL/L (ref 98–110)
SODIUM SERPL-SCNC: 139 MMOL/L (ref 136–146)

## 2017-09-10 RX ADMIN — HYDROCODONE BITARTRATE AND ACETAMINOPHEN PRN TAB: 5; 325 TABLET ORAL at 11:48

## 2017-09-10 RX ADMIN — SODIUM CHLORIDE SCH MLS/HR: 9 INJECTION, SOLUTION INTRAMUSCULAR; INTRAVENOUS; SUBCUTANEOUS at 08:29

## 2017-09-10 RX ADMIN — HYDROCODONE BITARTRATE AND ACETAMINOPHEN PRN TAB: 5; 325 TABLET ORAL at 20:42

## 2017-09-10 RX ADMIN — HYDROMORPHONE HCL-SODIUM CHLORIDE 0.9% INJ 6 MG/30ML SCH MG: 0.2 SOLUTION at 02:40

## 2017-09-10 RX ADMIN — METHYLPREDNISOLONE SODIUM SUCCINATE SCH MG: 125 INJECTION, POWDER, FOR SOLUTION INTRAMUSCULAR; INTRAVENOUS at 16:40

## 2017-09-10 RX ADMIN — SODIUM CHLORIDE SCH MLS/HR: 9 INJECTION, SOLUTION INTRAMUSCULAR; INTRAVENOUS; SUBCUTANEOUS at 20:43

## 2017-09-10 RX ADMIN — METHYLPREDNISOLONE SODIUM SUCCINATE SCH MG: 125 INJECTION, POWDER, FOR SOLUTION INTRAMUSCULAR; INTRAVENOUS at 08:30

## 2017-09-10 RX ADMIN — METHYLPREDNISOLONE SODIUM SUCCINATE SCH MG: 125 INJECTION, POWDER, FOR SOLUTION INTRAMUSCULAR; INTRAVENOUS at 00:16

## 2017-09-10 RX ADMIN — HYDROCODONE BITARTRATE AND ACETAMINOPHEN PRN TAB: 5; 325 TABLET ORAL at 16:40

## 2017-09-10 NOTE — PCM.PN
- General Info


Date of Service: 09/10/17


Admission Dx/Problem (Free Text): 





terminal ileitis, intractable nausea vomiting, intractable pain


Subjective Update: 





the patient is doing somewhat better today. Less nausea, vomiting, better pain 

control.





- Review of Systems


General: Reports: Weakness.  Denies: Appetite


HEENT: Reports: No Symptoms


Pulmonary: Reports: No Symptoms


Cardiovascular: Reports: No Symptoms


Gastrointestinal: Reports: Abdominal Pain, Nausea


Genitourinary: Reports: No Symptoms


Musculoskeletal: Reports: No Symptoms


Skin: Reports: No Symptoms


Neurological: Reports: No Symptoms


Psychiatric: Reports: No Symptoms





- Patient Data


Vitals - Most Recent: 


 Last Vital Signs











Temp  36.8 C   09/10/17 08:24


 


Pulse  51 L  09/10/17 08:24


 


Resp  14   09/10/17 08:24


 


BP  138/97 H  09/10/17 08:24


 


Pulse Ox  95   09/10/17 08:24











Weight - Most Recent: 53.6 kg


I&O - Last 24 Hours: 


 Intake & Output











 09/09/17 09/10/17 09/10/17





 22:59 06:59 14:59


 


Intake Total 1370 1300 


 


Output Total 600 1200 


 


Balance 770 100 











Lab Results Last 24 Hours: 


 Laboratory Results - last 24 hr











  09/10/17 09/10/17 Range/Units





  05:18 05:18 


 


WBC  13.44 H   (4.0-11.0)  K/uL


 


RBC  3.96 L   (4.30-5.90)  M/uL


 


Hgb  12.1   (12.0-16.0)  g/dL


 


Hct  35.0 L   (36.0-46.0)  %


 


MCV  88.4   (80.0-98.0)  fL


 


MCH  30.6   (27.0-32.0)  pg


 


MCHC  34.6   (31.0-37.0)  g/dL


 


RDW Std Deviation  45.8   (28.0-62.0)  fl


 


RDW Coeff of Gali  14   (11.0-15.0)  %


 


Plt Count  303   (150-400)  K/uL


 


MPV  9.70   (7.40-12.00)  fL


 


Neut % (Auto)  92.1 H   (48.0-80.0)  %


 


Lymph % (Auto)  5.9 L   (16.0-40.0)  %


 


Mono % (Auto)  1.9   (0.0-15.0)  %


 


Eos % (Auto)  0.0   (0.0-7.0)  %


 


Baso % (Auto)  0.1   (0.0-1.5)  %


 


Neut # (Auto)  12.4 H   (1.4-5.7)  K/uL


 


Lymph # (Auto)  0.8   (0.6-2.4)  K/uL


 


Mono # (Auto)  0.3   (0.0-0.8)  K/uL


 


Eos # (Auto)  0.0   (0.0-0.7)  K/uL


 


Baso # (Auto)  0.0   (0.0-0.1)  K/uL


 


Nucleated RBC %  0.0   /100WBC


 


Nucleated RBCs #  0   K/uL


 


Sodium   139  (136-146)  mmol/L


 


Potassium   4.2  (3.5-5.1)  mmol/L


 


Chloride   105  ()  mmol/L


 


Carbon Dioxide   24  (21-31)  mmol/L


 


BUN   11  (6.0-23.0)  mg/dL


 


Creatinine   0.7  (0.6-1.5)  mg/dL


 


Est Cr Clr Drug Dosing   107.57  mL/min


 


Estimated GFR (MDRD)   > 60.0  ml/min


 


Glucose   150 H  ()  mg/dL


 


Calcium   8.9  (8.8-10.8)  mg/dL


 


Total Bilirubin   0.2  (0.1-1.5)  mg/dL


 


AST   15  (5-40)  IU/L


 


ALT   18  (8-54)  IU/L


 


Alkaline Phosphatase   50  ()  


 


Total Protein   6.2  (6.0-8.0)  g/dL


 


Albumin   3.7  (3.5-5.0)  g/dL


 


Globulin   2.5  (2.0-3.5)  g/dL


 


Albumin/Globulin Ratio   1.5  (1.3-2.8)  


 


Amylase   79  (10-90)  U/L


 


Lipase   41  (7-80)  U/L











Med Orders - Current: 


 Current Medications





Hydrocodone Bitart/Acetaminophen (Norco 325-5 Mg)  0 tab PO Q4H PRN


   PRN Reason: Pain


Alprazolam (Xanax)  0.25 mg PO Q8H PRN


   PRN Reason: Anxiety


   Last Admin: 09/09/17 21:30 Dose:  0.25 mg


Hydrocortisone (Hydrocortisone 1% Crm)  30 gm TOP ASDIRECTED PRN


   PRN Reason: Itching


   Last Admin: 09/09/17 11:24 Dose:  1 applic


Lactated Ringer's (Ringers, Lactated)  1,000 mls @ 125 mls/hr IV ASDIRECTED [facility]


   Last Admin: 09/10/17 04:26 Dose:  125 mls/hr


Pantoprazole Sodium 40 mg/ (Sodium Chloride)  10 mls @ 300 mls/hr IVPUSH BID [facility]


   Last Admin: 09/10/17 08:29 Dose:  300 mls/hr


Methylprednisolone Sodium Succinate (Solu-Medrol)  125 mg IVPUSH Q8H [facility]


   Last Admin: 09/10/17 08:30 Dose:  125 mg


Metoclopramide HCl (Reglan)  10 mg IVPUSH Q6H [facility]


   Last Admin: 09/10/17 07:02 Dose:  10 mg


Promethazine HCl (Phenergan)  25 mg PO Q8H PRN


   PRN Reason: Nausea/Vomiting


   Last Admin: 09/10/17 08:28 Dose:  25 mg





Discontinued Medications





Hydrocodone Bitart/Acetaminophen (Norco 325-5 Mg)  1 - 2 tab PO Q4H PRN


   PRN Reason: Pain (moderate 4-6)


   Last Admin: 09/08/17 11:55 Dose:  2 tab


Hydromorphone HCl (Dilaudid)  1 mg IVPUSH Q1H PRN


   PRN Reason: Abdominal Pain


   Last Admin: 09/08/17 16:39 Dose:  1 mg


Hydromorphone HCl (Dilaudid Pca 6 Mg In Ns 30 Ml)  6 mg IV ASDIRECTED PRN; 

Protocol


   PRN Reason: Abdominal Pain


Hydromorphone HCl (Dilaudid Pca 6 Mg In Ns 30 Ml)  6 mg IV ASDIRECTED [facility]


   PRN Reason: Protocol


   Last Admin: 09/10/17 02:40 Dose:  6 mg


Sodium Chloride (Normal Saline)  1,000 mls @ 999 mls/hr IV STAT ONE


   Stop: 09/07/17 16:49


   Last Admin: 09/07/17 16:12 Dose:  999 mls/hr


Prochlorperazine Edisylate 10 (mg/ Sodium Chloride)  52 mls @ 150 mls/hr IV 

ONETIME ONE


   Stop: 09/07/17 16:45


   Last Admin: 09/07/17 17:02 Dose:  150 mls/hr


Sodium Chloride (Normal Saline)  1,000 mls @ 999 mls/hr IV .Bolus ONE


   Stop: 09/07/17 19:17


   Last Admin: 09/07/17 18:20 Dose:  999 mls/hr


Pantoprazole Sodium 40 mg/ (Sodium Chloride)  10 mls @ 300 mls/hr IVPUSH Q10H 

[facility]


   Last Admin: 09/08/17 17:47 Dose:  300 mls/hr


Iopamidol (Isovue Multipack-370 (76%))  70 ml IVPUSH ONETIME STA


   Stop: 09/07/17 16:49


   Last Admin: 09/07/17 16:48 Dose:  70 ml


Lorazepam (Ativan)  2 mg IVPUSH ONETIME ONE


   Stop: 09/07/17 16:24


   Last Admin: 09/07/17 16:44 Dose:  2 mg


Morphine Sulfate (Morphine)  2 mg IV ONETIME ONE


   Stop: 09/07/17 15:50


   Last Admin: 09/07/17 16:03 Dose:  2 mg


Morphine Sulfate (Morphine)  0 mg IVPUSH Q1H PRN


   PRN Reason: Pain (severe 7-10)


Ondansetron HCl (Zofran)  4 mg IVPUSH ONETIME ONE


   Stop: 09/07/17 15:50


   Last Admin: 09/07/17 16:03 Dose:  4 mg


Ondansetron HCl (Zofran)  4 mg IVPUSH Q6H PRN


   PRN Reason: Nausea/Vomiting


   Last Admin: 09/08/17 16:31 Dose:  4 mg


Ondansetron HCl (Zofran)  8 mg IVPUSH Q4H PRN


   PRN Reason: Nausea/Vomiting


   Last Admin: 09/09/17 09:04 Dose:  8 mg


Prednisone (Prednisone)  40 mg PO WITHBREAKFAST [facility]


   Last Admin: 09/08/17 18:22 Dose:  Not Given











- Exam


Quality Assessment: No: Supplemental Oxygen


General: Alert, Oriented, Cooperative, Mild Distress


HEENT: Pupils Equal, Pupils Reactive.  No: Scleral Icterus


Neck: Supple, Trachea Midline, No JVD


Lungs: Clear to Auscultation, Normal Respiratory Effort


Cardiovascular: Regular Rate, Regular Rhythm


GI/Abdominal Exam: Normal Bowel Sounds, Soft, Tender (less tender right upper 

quadrant).  No: Guarding, Rigid


Back Exam: Normal Inspection


Extremities: Normal Inspection, No Pedal Edema


Skin: Warm, Dry, Intact


Psy/Mental Status: Alert, Normal Affect, Normal Mood





- Problem List & Annotations


(1) Ileitis, terminal


SNOMED Code(s): 121372812


   Code(s): K50.00 - CROHN'S DISEASE OF SMALL INTESTINE WITHOUT COMPLICATIONS   

Status: Acute   Priority: High   Current Visit: Yes   


Qualifiers: 


   Digestive disease complication type: without complication   Qualified Code(s)

: K50.00 - Crohn's disease of small intestine without complications   


Annotation/Comment:: No prior history of Crohn's disease or hematochezia.


Diagnosis suspected based on CT report of inflammatory changes in terminal 

ileum and cecum.   





(2) Right lower quadrant abdominal pain


SNOMED Code(s): 120270173


   Code(s): R10.31 - RIGHT LOWER QUADRANT PAIN   Status: Acute   Priority: High

   Current Visit: Yes   Annotation/Comment:: improving   





(3) Anxiety


SNOMED Code(s): 48674553


   Code(s): F41.9 - ANXIETY DISORDER, UNSPECIFIED   Status: Chronic   Priority: 

High   Current Visit: Yes   





- Problem List Review


Problem List Initiated/Reviewed/Updated: Yes





- My Orders


Last 24 Hours: 


My Active Orders





09/09/17 10:49


Hydrocortisone [Hydrocortisone 1% Crm]   30 gm TOP ASDIRECTED PRN 





09/09/17 11:33


Promethazine [Phenergan]   25 mg PO Q8H PRN 





09/10/17 10:55


Acetaminophen/HYDROcodone [Norco 325-5 MG]   See Dose Instructions  PO Q4H PRN 














- Assessment


Assessment:: 





The patient is a 22-year-old lady who is still having difficulty with right 

lower quadrant abdominal pain. She has had some nausea but vomiting is 

lessened. I have discussed with the patient concern for small bowel obstruction 

which I doubt at this point however, I have ordered a flat and upright 

abdominal plate x-ray to exclude this. I've also discussed with the patient the 

possibility of an NG tube if she still continues to vomit. The patient says 

that some of her family members want her to go to [place] and I explained that 

this may not be possible to transfer secondary to concerns for federal law. For 

now I have explained and outlined the patient's treatment plan with the patient 

and have reviewed notes from Dr. Fonseca. I'll see the patient in follow-up 

and her treatment plan will be adjusted accordingly for now she'll be kept on 

the Dilaudid PCA pump as well as Zofran to help with the nausea and vomiting. I 

have suggested to the patient that she try to avoid acidic foods or juices and 

stick primarily to water.





- Plan


Plan:: 


The patient is doing much better today. For now she'll be continued on steroids 

for the CT suggested terminal ileitis. I've also ordered Dilaudid PCA pump 

discontinued. The patient is to have clear liquid diets and likely will be 

advanced tomorrow. I did discussion with the surgeon and the patient will 

likely be discharged on steroids with recommendation to follow-up with 

gastroenterologist. I suspect that the patient will be appropriate for 

discharge tomorrow after her diet is advanced accordingly. The patient has been 

recommended to ambulate as tolerated.





I like to thank Dr. Fonseca for participation in care of this patient.

## 2017-09-10 NOTE — PCM.SURGPN
- General Info


Date of Service: 09/10/17


Functional Status: Reports: Pain Controlled, Tolerating Diet, Ambulating, 

Urinating.  Denies: New Symptoms


Pain Score: 7





- Review of Systems


General: Denies: Fever, Weakness, Fatigue, Appetite


HEENT: Reports: No Symptoms


Pulmonary: Reports: No Symptoms


Cardiovascular: Reports: No Symptoms


Gastrointestinal: Reports: Decreased Appetite, Flatus.  Denies: Abdominal Pain (

Much less today.), Constipation, Diarrhea, Nausea, Vomiting


Genitourinary: Reports: No Symptoms


Musculoskeletal: Reports: No Symptoms


Skin: Reports: No Symptoms


Neurological: Reports: No Symptoms


Psychiatric: Reports: No Symptoms





- Patient Data


Vitals - Most Recent: 


 Last Vital Signs











Temp  97.7 F   09/10/17 04:00


 


Pulse  52 L  09/10/17 04:00


 


Resp  12   09/10/17 04:00


 


BP  134/79   09/10/17 04:00


 


Pulse Ox  94 L  09/10/17 04:00











Weight - Most Recent: 118 lb 2.684 oz


I&O - Last 24 Hours: 


 Intake & Output











 09/09/17 09/10/17 09/10/17





 19:59 03:59 11:59


 


Intake Total 360 1010 1300


 


Output Total 600  1200


 


Balance -240 1010 100











Lab Results Last 24 Hrs: 


 Laboratory Results - last 24 hr











  09/10/17 09/10/17 Range/Units





  05:18 05:18 


 


WBC  13.44 H   (4.0-11.0)  K/uL


 


RBC  3.96 L   (4.30-5.90)  M/uL


 


Hgb  12.1   (12.0-16.0)  g/dL


 


Hct  35.0 L   (36.0-46.0)  %


 


MCV  88.4   (80.0-98.0)  fL


 


MCH  30.6   (27.0-32.0)  pg


 


MCHC  34.6   (31.0-37.0)  g/dL


 


RDW Std Deviation  45.8   (28.0-62.0)  fl


 


RDW Coeff of Gali  14   (11.0-15.0)  %


 


Plt Count  303   (150-400)  K/uL


 


MPV  9.70   (7.40-12.00)  fL


 


Neut % (Auto)  92.1 H   (48.0-80.0)  %


 


Lymph % (Auto)  5.9 L   (16.0-40.0)  %


 


Mono % (Auto)  1.9   (0.0-15.0)  %


 


Eos % (Auto)  0.0   (0.0-7.0)  %


 


Baso % (Auto)  0.1   (0.0-1.5)  %


 


Neut # (Auto)  12.4 H   (1.4-5.7)  K/uL


 


Lymph # (Auto)  0.8   (0.6-2.4)  K/uL


 


Mono # (Auto)  0.3   (0.0-0.8)  K/uL


 


Eos # (Auto)  0.0   (0.0-0.7)  K/uL


 


Baso # (Auto)  0.0   (0.0-0.1)  K/uL


 


Nucleated RBC %  0.0   /100WBC


 


Nucleated RBCs #  0   K/uL


 


Sodium   139  (136-146)  mmol/L


 


Potassium   4.2  (3.5-5.1)  mmol/L


 


Chloride   105  ()  mmol/L


 


Carbon Dioxide   24  (21-31)  mmol/L


 


BUN   11  (6.0-23.0)  mg/dL


 


Creatinine   0.7  (0.6-1.5)  mg/dL


 


Est Cr Clr Drug Dosing   107.57  mL/min


 


Estimated GFR (MDRD)   > 60.0  ml/min


 


Glucose   150 H  ()  mg/dL


 


Calcium   8.9  (8.8-10.8)  mg/dL


 


Total Bilirubin   0.2  (0.1-1.5)  mg/dL


 


AST   15  (5-40)  IU/L


 


ALT   18  (8-54)  IU/L


 


Alkaline Phosphatase   50  ()  


 


Total Protein   6.2  (6.0-8.0)  g/dL


 


Albumin   3.7  (3.5-5.0)  g/dL


 


Globulin   2.5  (2.0-3.5)  g/dL


 


Albumin/Globulin Ratio   1.5  (1.3-2.8)  


 


Amylase   79  (10-90)  U/L


 


Lipase   41  (7-80)  U/L











Med Orders - Current: 


 Current Medications





Alprazolam (Xanax)  0.25 mg PO Q8H PRN


   PRN Reason: Anxiety


   Last Admin: 09/09/17 21:30 Dose:  0.25 mg


Hydrocortisone (Hydrocortisone 1% Crm)  30 gm TOP ASDIRECTED PRN


   PRN Reason: Itching


   Last Admin: 09/09/17 11:24 Dose:  1 applic


Hydromorphone HCl (Dilaudid Pca 6 Mg In Ns 30 Ml)  6 mg IV ASDIRECTED [facility]


   PRN Reason: Protocol


   Last Admin: 09/10/17 02:40 Dose:  6 mg


Lactated Ringer's (Ringers, Lactated)  1,000 mls @ 125 mls/hr IV ASDIRECTED [facility]


   Last Admin: 09/10/17 04:26 Dose:  125 mls/hr


Pantoprazole Sodium 40 mg/ (Sodium Chloride)  10 mls @ 300 mls/hr IVPUSH BID [facility]


   Last Admin: 09/09/17 20:08 Dose:  300 mls/hr


Methylprednisolone Sodium Succinate (Solu-Medrol)  125 mg IVPUSH Q8H [facility]


   Last Admin: 09/10/17 00:16 Dose:  125 mg


Metoclopramide HCl (Reglan)  10 mg IVPUSH Q6H [facility]


   Last Admin: 09/10/17 07:02 Dose:  10 mg


Promethazine HCl (Phenergan)  25 mg PO Q8H PRN


   PRN Reason: Nausea/Vomiting


   Last Admin: 09/09/17 21:27 Dose:  25 mg





Discontinued Medications





Hydrocodone Bitart/Acetaminophen (Norco 325-5 Mg)  1 - 2 tab PO Q4H PRN


   PRN Reason: Pain (moderate 4-6)


   Last Admin: 09/08/17 11:55 Dose:  2 tab


Hydromorphone HCl (Dilaudid)  1 mg IVPUSH Q1H PRN


   PRN Reason: Abdominal Pain


   Last Admin: 09/08/17 16:39 Dose:  1 mg


Hydromorphone HCl (Dilaudid Pca 6 Mg In Ns 30 Ml)  6 mg IV ASDIRECTED PRN; 

Protocol


   PRN Reason: Abdominal Pain


Sodium Chloride (Normal Saline)  1,000 mls @ 999 mls/hr IV STAT ONE


   Stop: 09/07/17 16:49


   Last Admin: 09/07/17 16:12 Dose:  999 mls/hr


Prochlorperazine Edisylate 10 (mg/ Sodium Chloride)  52 mls @ 150 mls/hr IV 

ONETIME ONE


   Stop: 09/07/17 16:45


   Last Admin: 09/07/17 17:02 Dose:  150 mls/hr


Sodium Chloride (Normal Saline)  1,000 mls @ 999 mls/hr IV .Bolus ONE


   Stop: 09/07/17 19:17


   Last Admin: 09/07/17 18:20 Dose:  999 mls/hr


Pantoprazole Sodium 40 mg/ (Sodium Chloride)  10 mls @ 300 mls/hr IVPUSH Q10H 

[facility]


   Last Admin: 09/08/17 17:47 Dose:  300 mls/hr


Iopamidol (Isovue Multipack-370 (76%))  70 ml IVPUSH ONETIME STA


   Stop: 09/07/17 16:49


   Last Admin: 09/07/17 16:48 Dose:  70 ml


Lorazepam (Ativan)  2 mg IVPUSH ONETIME ONE


   Stop: 09/07/17 16:24


   Last Admin: 09/07/17 16:44 Dose:  2 mg


Morphine Sulfate (Morphine)  2 mg IV ONETIME ONE


   Stop: 09/07/17 15:50


   Last Admin: 09/07/17 16:03 Dose:  2 mg


Morphine Sulfate (Morphine)  0 mg IVPUSH Q1H PRN


   PRN Reason: Pain (severe 7-10)


Ondansetron HCl (Zofran)  4 mg IVPUSH ONETIME ONE


   Stop: 09/07/17 15:50


   Last Admin: 09/07/17 16:03 Dose:  4 mg


Ondansetron HCl (Zofran)  4 mg IVPUSH Q6H PRN


   PRN Reason: Nausea/Vomiting


   Last Admin: 09/08/17 16:31 Dose:  4 mg


Ondansetron HCl (Zofran)  8 mg IVPUSH Q4H PRN


   PRN Reason: Nausea/Vomiting


   Last Admin: 09/09/17 09:04 Dose:  8 mg


Prednisone (Prednisone)  40 mg PO WITHBREAKFAST [facility]


   Last Admin: 09/08/17 18:22 Dose:  Not Given











- Exam


Wound/Incisions: Healing Well.  No: No Drainage, Erythema


General: Alert, Oriented, Cooperative, Mild Distress


HEENT: Pupils Equal, Pupils Reactive, EOMI.  No: Scleral Icterus


Neck: Supple


Lungs: Clear to Auscultation


Cardiovascular: Regular Rate, Regular Rhythm.  No: Tachycardia


GI/Abdominal Exam: Normal Bowel Sounds, Soft, Non-Tender, No Distention, No 

Mass.  No: Guarding, Rigid, Rebound


Extremities: Normal Inspection


Skin: Warm, Dry, Intact


Neurological: No New Focal Deficit


Psy/Mental Status: Alert, Normal Affect, Normal Mood





- Problem List & Annotations


(1) Abdominal pain


SNOMED Code(s): 31993609


   Code(s): R10.9 - UNSPECIFIED ABDOMINAL PAIN   Status: Acute   Priority: High

   Current Visit: Yes   





(2) Anxiety


SNOMED Code(s): 67771807


   Code(s): F41.9 - ANXIETY DISORDER, UNSPECIFIED   Status: Acute   Priority: 

High   Current Visit: Yes   





(3) Ileitis, terminal


SNOMED Code(s): 879911117


   Code(s): K50.00 - CROHN'S DISEASE OF SMALL INTESTINE WITHOUT COMPLICATIONS   

Status: Acute   Priority: Medium   Current Visit: Yes   Annotation/Comment:: No 

prior history of Crohn's disease or hematochezia.


Diagnosis suspected based on CT report of inflammatory changes in terminal 

ileum and cecum.   


Qualifiers: 


   Digestive disease complication type: without complication   Qualified Code(s)

: K50.00 - Crohn's disease of small intestine without complications   





- Problem List Review


Problem List Initiated/Reviewed/Updated: Yes





- My Orders


Last 24 Hours: 


 Active Orders 24 hr











 Category Date Time Status


 


 Abdomen 2V AP Flat Upright [CR] Urgent Exams  09/09/17 08:28 Taken


 


 Hydrocortisone [Hydrocortisone 1% Crm] Med  09/09/17 10:49 Active





 30 gm TOP ASDIRECTED PRN   


 


 Promethazine [Phenergan] Med  09/09/17 11:33 Active





 25 mg PO Q8H PRN   








 Medication Orders





Alprazolam (Xanax)  0.25 mg PO Q8H PRN


   PRN Reason: Anxiety


   Last Admin: 09/09/17 21:30  Dose: 0.25 mg


   Admin: 09/09/17 10:38  Dose: 0.25 mg


   Admin: 09/09/17 00:30  Dose: 0.25 mg


   Admin: 09/08/17 14:32  Dose: 0.25 mg


Hydrocortisone (Hydrocortisone 1% Crm)  30 gm TOP ASDIRECTED PRN


   PRN Reason: Itching


   Last Admin: 09/09/17 11:24  Dose: 1 applic


Hydromorphone HCl (Dilaudid Pca 6 Mg In Ns 30 Ml)  6 mg IV ASDIRECTED [facility]


   PRN Reason: Protocol


   Last Admin: 09/10/17 02:40  Dose: 6 mg


   Admin: 09/09/17 10:45  Dose: 6 mg


   Admin: 09/08/17 17:48  Dose: 6 mg


Lactated Ringer's (Ringers, Lactated)  1,000 mls @ 125 mls/hr IV ASDIRECTED [facility]


   Last Admin: 09/10/17 04:26  Dose: 125 mls/hr


   Infusion: 09/10/17 04:05  Dose: 125 mls/hr


   Admin: 09/09/17 20:05  Dose: 125 mls/hr


   Infusion: 09/09/17 20:03  Dose: 125 mls/hr


   Admin: 09/09/17 12:03  Dose: 125 mls/hr


   Infusion: 09/09/17 11:47  Dose: 125 mls/hr


   Admin: 09/09/17 03:47  Dose: 125 mls/hr


   Infusion: 09/09/17 03:47  Dose: 125 mls/hr


   Admin: 09/08/17 19:58  Dose: 125 mls/hr


   Infusion: 09/08/17 19:46  Dose: 125 mls/hr


   Admin: 09/08/17 11:46  Dose: 125 mls/hr


   Infusion: 09/08/17 11:46  Dose: 125 mls/hr


   Admin: 09/08/17 03:47  Dose: 125 mls/hr


   Infusion: 09/08/17 03:39  Dose: 125 mls/hr


   Admin: 09/07/17 19:39  Dose: 125 mls/hr


Pantoprazole Sodium 40 mg/ (Sodium Chloride)  10 mls @ 300 mls/hr IVPUSH BID [facility]


   Last Admin: 09/09/17 20:08  Dose: 300 mls/hr


   Infusion: 09/09/17 08:54  Dose: 300 mls/hr


   Admin: 09/09/17 08:52  Dose: 300 mls/hr


Methylprednisolone Sodium Succinate (Solu-Medrol)  125 mg IVPUSH Q8H [facility]


   Last Admin: 09/10/17 00:16  Dose: 125 mg


   Admin: 09/09/17 18:56  Dose: 125 mg


   Admin: 09/09/17 08:52  Dose: 125 mg


   Admin: 09/09/17 00:23  Dose: 125 mg


   Admin: 09/08/17 17:48  Dose: 125 mg


Metoclopramide HCl (Reglan)  10 mg IVPUSH Q6H [facility]


   Last Admin: 09/10/17 07:02  Dose: 10 mg


   Admin: 09/10/17 00:31  Dose: 10 mg


   Admin: 09/09/17 18:56  Dose: 10 mg


   Admin: 09/09/17 13:13  Dose: 10 mg


   Admin: 09/09/17 06:37  Dose: 10 mg


   Admin: 09/09/17 00:31  Dose: 10 mg


   Admin: 09/08/17 18:47  Dose: 10 mg


   Admin: 09/08/17 13:22  Dose: 10 mg


   Admin: 09/08/17 06:31  Dose: 10 mg


   Admin: 09/08/17 00:35  Dose: 10 mg


   Admin: 09/07/17 19:58  Dose: 10 mg


Promethazine HCl (Phenergan)  25 mg PO Q8H PRN


   PRN Reason: Nausea/Vomiting


   Last Admin: 09/09/17 21:27  Dose: 25 mg


   Admin: 09/09/17 12:03  Dose: 25 mg











- Assessment


Assessment           (Free Text/Narrative):: 





Pain improved from yesterday.  No N/V today.  Tolerating po full liquids.  WBC 

elevated--steroid effect.





- Plan


Plan                        (Free Text/Narrative):: 





Continue IV fluids, PCA and parenteral steroids.


If continues to improve, consider discharge tomorrow on po pain meds and 

analgesics.


May need to consider GI consult on elective basis.

## 2017-09-11 VITALS — DIASTOLIC BLOOD PRESSURE: 94 MMHG | SYSTOLIC BLOOD PRESSURE: 136 MMHG

## 2017-09-11 LAB
CHLORIDE SERPL-SCNC: 105 MMOL/L (ref 98–110)
SODIUM SERPL-SCNC: 139 MMOL/L (ref 136–146)

## 2017-09-11 RX ADMIN — HYDROCODONE BITARTRATE AND ACETAMINOPHEN PRN TAB: 5; 325 TABLET ORAL at 12:57

## 2017-09-11 RX ADMIN — METHYLPREDNISOLONE SODIUM SUCCINATE SCH MG: 125 INJECTION, POWDER, FOR SOLUTION INTRAMUSCULAR; INTRAVENOUS at 01:15

## 2017-09-11 RX ADMIN — SODIUM CHLORIDE SCH MLS/HR: 9 INJECTION, SOLUTION INTRAMUSCULAR; INTRAVENOUS; SUBCUTANEOUS at 09:26

## 2017-09-11 RX ADMIN — HYDROCODONE BITARTRATE AND ACETAMINOPHEN PRN TAB: 5; 325 TABLET ORAL at 04:58

## 2017-09-11 RX ADMIN — METHYLPREDNISOLONE SODIUM SUCCINATE SCH MG: 125 INJECTION, POWDER, FOR SOLUTION INTRAMUSCULAR; INTRAVENOUS at 09:23

## 2017-09-11 NOTE — PCM.PN
- General Info


Date of Service: 09/11/17


Admission Dx/Problem (Free Text): 





terminal ileitis, intractable nausea vomiting, intractable pain


Subjective Update: 


Reports she is doing better today, less abdominal pain. Pain is tolerable with 

PO pain medications. SHe has been sleeping a lot of the morning and hasn't 

eaten breakfast yet. 





Functional Status: Reports: Pain Controlled, Ambulating, Urinating





- Review of Systems


General: Reports: No Symptoms.  Denies: Fever


Pulmonary: Reports: No Symptoms.  Denies: Shortness of Breath


Cardiovascular: Reports: No Symptoms.  Denies: Chest Pain


Gastrointestinal: Reports: Abdominal Pain (RLQ, improving.), Flatus.  Denies: 

Diarrhea, Nausea, Vomiting


Genitourinary: Reports: No Symptoms.  Denies: Dysuria, Frequency, Burning


Skin: Reports: No Symptoms


Neurological: Reports: No Symptoms


Psychiatric: Reports: No Symptoms





- Patient Data


Vitals - Most Recent: 


 Last Vital Signs











Temp  98.7 F   09/11/17 08:00


 


Pulse  53 L  09/11/17 08:00


 


Resp  16   09/11/17 08:00


 


BP  139/97 H  09/11/17 08:00


 


Pulse Ox  96   09/11/17 08:00











Weight - Most Recent: 53.6 kg


I&O - Last 24 Hours: 


 Intake & Output











 09/10/17 09/11/17 09/11/17





 22:59 06:59 14:59


 


Intake Total 1250 1675 


 


Output Total 500  


 


Balance 750 1675 











Lab Results Last 24 Hours: 


 Laboratory Results - last 24 hr











  09/11/17 09/11/17 Range/Units





  05:37 05:37 


 


WBC  12.30 H   (4.0-11.0)  K/uL


 


RBC  4.28 L   (4.30-5.90)  M/uL


 


Hgb  13.1   (12.0-16.0)  g/dL


 


Hct  37.9   (36.0-46.0)  %


 


MCV  88.6   (80.0-98.0)  fL


 


MCH  30.6   (27.0-32.0)  pg


 


MCHC  34.6   (31.0-37.0)  g/dL


 


RDW Std Deviation  45.1   (28.0-62.0)  fl


 


RDW Coeff of Gali  14   (11.0-15.0)  %


 


Plt Count  323   (150-400)  K/uL


 


MPV  9.50   (7.40-12.00)  fL


 


Neut % (Auto)  91.3 H   (48.0-80.0)  %


 


Lymph % (Auto)  5.9 L   (16.0-40.0)  %


 


Mono % (Auto)  2.8   (0.0-15.0)  %


 


Eos % (Auto)  0.0   (0.0-7.0)  %


 


Baso % (Auto)  0.0   (0.0-1.5)  %


 


Neut # (Auto)  11.2 H   (1.4-5.7)  K/uL


 


Lymph # (Auto)  0.7   (0.6-2.4)  K/uL


 


Mono # (Auto)  0.3   (0.0-0.8)  K/uL


 


Eos # (Auto)  0.0   (0.0-0.7)  K/uL


 


Baso # (Auto)  0.0   (0.0-0.1)  K/uL


 


Nucleated RBC %  0.0   /100WBC


 


Nucleated RBCs #  0   K/uL


 


Sodium   139  (136-146)  mmol/L


 


Potassium   3.7  (3.5-5.1)  mmol/L


 


Chloride   105  ()  mmol/L


 


Carbon Dioxide   25  (21-31)  mmol/L


 


BUN   10  (6.0-23.0)  mg/dL


 


Creatinine   0.7  (0.6-1.5)  mg/dL


 


Est Cr Clr Drug Dosing   107.57  mL/min


 


Estimated GFR (MDRD)   > 60.0  ml/min


 


Glucose   138 H  ()  mg/dL


 


Calcium   8.9  (8.8-10.8)  mg/dL











Med Orders - Current: 


 Current Medications





Hydrocodone Bitart/Acetaminophen (Norco 325-5 Mg)  0 tab PO Q4H PRN


   PRN Reason: Pain


   Last Admin: 09/11/17 04:58 Dose:  2 tab


Alprazolam (Xanax)  0.25 mg PO Q8H PRN


   PRN Reason: Anxiety


   Last Admin: 09/10/17 20:42 Dose:  0.25 mg


Hydrocortisone (Hydrocortisone 1% Crm)  30 gm TOP ASDIRECTED PRN


   PRN Reason: Itching


   Last Admin: 09/09/17 11:24 Dose:  1 applic


Hydromorphone HCl (Dilaudid)  1 mg IVPUSH Q2H PRN


   PRN Reason: Pain


   Last Admin: 09/10/17 18:47 Dose:  1 mg


Lactated Ringer's (Ringers, Lactated)  1,000 mls @ 125 mls/hr IV ASDIRECTED [facility]


   Last Admin: 09/11/17 04:57 Dose:  125 mls/hr


Pantoprazole Sodium 40 mg/ (Sodium Chloride)  10 mls @ 300 mls/hr IVPUSH BID [facility]


   Last Admin: 09/11/17 09:26 Dose:  300 mls/hr


Methylprednisolone Sodium Succinate (Solu-Medrol)  125 mg IVPUSH Q8H [facility]


   Last Admin: 09/11/17 09:23 Dose:  125 mg


Metoclopramide HCl (Reglan)  10 mg IVPUSH Q6H [facility]


   Last Admin: 09/11/17 06:42 Dose:  10 mg


Promethazine HCl (Phenergan)  25 mg PO Q8H PRN


   PRN Reason: Nausea/Vomiting


   Last Admin: 09/10/17 16:40 Dose:  25 mg





Discontinued Medications





Hydrocodone Bitart/Acetaminophen (Norco 325-5 Mg)  1 - 2 tab PO Q4H PRN


   PRN Reason: Pain (moderate 4-6)


   Last Admin: 09/08/17 11:55 Dose:  2 tab


Hydromorphone HCl (Dilaudid)  1 mg IVPUSH Q1H PRN


   PRN Reason: Abdominal Pain


   Last Admin: 09/08/17 16:39 Dose:  1 mg


Hydromorphone HCl (Dilaudid Pca 6 Mg In Ns 30 Ml)  6 mg IV ASDIRECTED PRN; 

Protocol


   PRN Reason: Abdominal Pain


Hydromorphone HCl (Dilaudid Pca 6 Mg In Ns 30 Ml)  6 mg IV ASDIRECTED [facility]


   PRN Reason: Protocol


   Last Admin: 09/10/17 02:40 Dose:  6 mg


Sodium Chloride (Normal Saline)  1,000 mls @ 999 mls/hr IV STAT ONE


   Stop: 09/07/17 16:49


   Last Admin: 09/07/17 16:12 Dose:  999 mls/hr


Prochlorperazine Edisylate 10 (mg/ Sodium Chloride)  52 mls @ 150 mls/hr IV 

ONETIME ONE


   Stop: 09/07/17 16:45


   Last Admin: 09/07/17 17:02 Dose:  150 mls/hr


Sodium Chloride (Normal Saline)  1,000 mls @ 999 mls/hr IV .Bolus ONE


   Stop: 09/07/17 19:17


   Last Admin: 09/07/17 18:20 Dose:  999 mls/hr


Pantoprazole Sodium 40 mg/ (Sodium Chloride)  10 mls @ 300 mls/hr IVPUSH Q10H 

[facility]


   Last Admin: 09/08/17 17:47 Dose:  300 mls/hr


Iopamidol (Isovue Multipack-370 (76%))  70 ml IVPUSH ONETIME STA


   Stop: 09/07/17 16:49


   Last Admin: 09/07/17 16:48 Dose:  70 ml


Lorazepam (Ativan)  2 mg IVPUSH ONETIME ONE


   Stop: 09/07/17 16:24


   Last Admin: 09/07/17 16:44 Dose:  2 mg


Morphine Sulfate (Morphine)  2 mg IV ONETIME ONE


   Stop: 09/07/17 15:50


   Last Admin: 09/07/17 16:03 Dose:  2 mg


Morphine Sulfate (Morphine)  0 mg IVPUSH Q1H PRN


   PRN Reason: Pain (severe 7-10)


Ondansetron HCl (Zofran)  4 mg IVPUSH ONETIME ONE


   Stop: 09/07/17 15:50


   Last Admin: 09/07/17 16:03 Dose:  4 mg


Ondansetron HCl (Zofran)  4 mg IVPUSH Q6H PRN


   PRN Reason: Nausea/Vomiting


   Last Admin: 09/08/17 16:31 Dose:  4 mg


Ondansetron HCl (Zofran)  8 mg IVPUSH Q4H PRN


   PRN Reason: Nausea/Vomiting


   Last Admin: 09/09/17 09:04 Dose:  8 mg


Prednisone (Prednisone)  40 mg PO WITHBREAKFAST [facility]


   Last Admin: 09/08/17 18:22 Dose:  Not Given











- Exam


General: Alert, Oriented, Cooperative


Neck: Supple


Lungs: Clear to Auscultation, Normal Respiratory Effort


Cardiovascular: Regular Rate, Regular Rhythm


GI/Abdominal Exam: Normal Bowel Sounds, Soft, No Mass, Tender (RLQ).  No: 

Distended, Guarding


Extremities: Normal Inspection, Normal Range of Motion, Non-Tender, No Pedal 

Edema, Normal Capillary Refill


Neurological: No New Focal Deficit


Psy/Mental Status: Alert, Normal Affect, Normal Mood





- Problem List & Annotations


(1) Ileitis, terminal


SNOMED Code(s): 549452522


   Code(s): K50.00 - CROHN'S DISEASE OF SMALL INTESTINE WITHOUT COMPLICATIONS   

Status: Acute   Priority: High   Current Visit: Yes   


Qualifiers: 


   Digestive disease complication type: without complication   Qualified Code(s)

: K50.00 - Crohn's disease of small intestine without complications   


Annotation/Comment:: No prior history of Crohn's disease or hematochezia.


Diagnosis suspected based on CT report of inflammatory changes in terminal 

ileum and cecum.   





(2) Right lower quadrant abdominal pain


SNOMED Code(s): 373645003


   Code(s): R10.31 - RIGHT LOWER QUADRANT PAIN   Status: Acute   Priority: High

   Current Visit: Yes   Annotation/Comment:: improving   





(3) Anxiety


SNOMED Code(s): 24113258


   Code(s): F41.9 - ANXIETY DISORDER, UNSPECIFIED   Status: Chronic   Priority: 

High   Current Visit: Yes   





- Problem List Review


Problem List Initiated/Reviewed/Updated: Yes





- Plan


Plan:: 








This 22 year old female admitted with RLQ pain and terminal ileitis, s/p lap 

appy 8/30/2017





1. Terminal ileitis: Advance diet to [place]. Would recommend steroid taper until 

follow up with GI, she would like to be seen in [place]. Will arrange 

referral. For steroids would recommend Prednisone 40 mg daily then slow taper 

after remission of pain. 





Hospitalists will sign off now, please reconsult if there are concerns or 

questions.

## 2017-09-11 NOTE — CR
EXAM DATE: 17



PATIENT'S AGE: 22





Patient: CINDY VELOZ



Facility: [place], [place]

Patient ID: [id number]

Site Patient ID: [id number].

Site Accession #: [id number].

: 1995

Study: XRay Abdomen [id number]/9/2017 9:37:40 AM

Ordering Physician: Raymundo Martinez



Final Report: 

HISTORY:

Abdominal pain.



TECHNIQUE:

Flat and upright abdominal radiographs.



COMPARISON:

CT 2017.



FINDINGS:

There is no free intraperitoneal air. No dilated small bowel loops. Contrast 
material is present within the colon which is nondilated. Lung bases are clear. 
No acute bony abnormality.



IMPRESSION:

No obstruction or free air.



Dictated by Aamir Guaman MD @ 2017 9:44:23 AM





Dictated by: Aamir Guaman MD @ 2017 09:44:27

(Electronic Signature)



Report Signed by Proxy.
[facility]

## 2017-09-11 NOTE — [id number]
**Discharge Summary





- Hospital Course


Free Text/Narrative:: 





21 y/o female admitted approximately 10 days post op with RLQ pain, nausea and 

vomiting but no fever, chills or leucocytosis.  Required Dilaudid PCA for pain 

control.  CT did not reveal any abscess but did show thickening of the terminal 

ileum and cecum.  Patient has had no rectal bleeding.





Patient is feeling better today while on steroids.  WBC was unchanged until 

steroids were started-expected reaction to steroids.  Tolerating a full liquid 

diet.


Agreeable to GI consultation.





Will follow with Dr. Bowman, Elisha Logan and me.  I need to see her back in 

one month.


Brief History: 21 y/o female who underwent a laparoscopic appendectomy for 

early appendicitis on 8/30.  Did well postop but then developed recurrence of 

her RLQ pain with N/V.  No fever or leucocytosis during this time.  Admitted 

for pain control after CT showed thickening of the terminal ileum and cecum.





- Discharge Data


Discharge Date: 09/11/17


Discharge Disposition: Home, Self-Care 01


Condition: Fair





- Discharge Diagnosis/Problem(s)


(1) Abdominal pain


SNOMED Code(s): 93917394


   ICD Code: R10.9 - UNSPECIFIED ABDOMINAL PAIN   Status: Acute   Priority: 

High   Current Visit: Yes   





(2) Anxiety


SNOMED Code(s): 39278102


   ICD Code: F41.9 - ANXIETY DISORDER, UNSPECIFIED   Status: Chronic   Priority

: High   Current Visit: Yes   





(3) Ileitis, terminal


SNOMED Code(s): 422400319


   ICD Code: K50.00 - CROHN'S DISEASE OF SMALL INTESTINE WITHOUT COMPLICATIONS 

  Status: Acute   Priority: High   Current Visit: Yes   Problem Details: No 

prior history of Crohn's disease or hematochezia.


Diagnosis suspected based on CT report of inflammatory changes in terminal 

ileum and cecum.   


Qualifiers: 


   Qualified Code(s): K50.00 - Crohn's disease of small intestine without 

complications   





- Patient Instructions


Diet: Usual Diet as Tolerated


Activity: No Lifting Over 25 Pounds (for 1 month)


Driving: [place] [address] Today


Showering/Bathing: May Shower


Notify Provider of: Fever, Increased Pain, Nausea and/or Vomiting


Other/Special Instructions: GI consult in [place] or [place] at patients' 

choice.  See Dr. Fonseca in one month.





- Discharge Plan


Prescriptions/Med Rec: 


Acetaminophen/HYDROcodone [Norco 325-5 MG] 1 - 2 tab PO Q4H PRN #25 tablet


 PRN Reason: Pain


Prednisone [IJD: predniSONE] 40 mg PO WITHBREAKFAST #30 tab


Home Medications: 


 Home Meds





Bismuth Subsalicylate [Pepto Bismol] 262 mg PO ONETIME 08/30/17 [History]


Norgestrel-Ethinyl Estradiol [Cryselle-28 Tablet] 1 tab PO DAILY 08/30/17 [

History]


Ondansetron [Zofran ODT] 4 mg PO Q6H PRN #30 tab.dis 08/31/17 [Rx]


Acetaminophen/HYDROcodone [Norco 325-5 MG] 1 - 2 tab PO Q4H PRN #25 tablet 09/11 /17 [Rx]


Prednisone [IJD: predniSONE] 40 mg PO WITHBREAKFAST #30 tab 09/11/17 [Rx]








Patient Handouts:  Acetaminophen; Hydrocodone tablets or capsules, Abdominal 

Pain, Adult, Easy-to-Read, Prednisone tablets


Forms:  ED Department Discharge


Referrals: 


Juan Fonseca MD [Physician] - 09/12/17 10:45 am


Elisha Logan NP [Primary Care Provider] - 09/18/17 9:00 am





- Discharge Summary/Plan Comment


DC Time >30 min.: No





- General Info


Date of Service: 09/11/17


Admission Dx/Problem (Free Text: 





terminal ileitis, intractable nausea vomiting, intractable pain


Subjective Update: 


Reports she is doing better today, less abdominal pain. Pain is tolerable with 

PO pain medications. She has been sleeping a lot of the morning and hasn't 

eaten breakfast yet. 





She still rates her pain a "7" but wants to go home.  Agreeable to 

gastroenterologic consultation.  Would prefer [place].


Functional Status: Reports: Pain Controlled, Tolerating Diet, Ambulating, 

Urinating.  Denies: New Symptoms





- Review of Systems


General: Reports: Appetite.  Denies: Fever, Weakness, Fatigue, Chills


HEENT: Reports: No Symptoms


Pulmonary: Reports: No Symptoms


Cardiovascular: Reports: No Symptoms


Gastrointestinal: Reports: Abdominal Pain, Constipation, Flatus.  Denies: 

Decreased Appetite, Diarrhea, Hematochezia, Melena, Nausea, Vomiting


Genitourinary: Reports: No Symptoms


Musculoskeletal: Reports: No Symptoms


Skin: Reports: No Symptoms


Neurological: Reports: No Symptoms


Psychiatric: Reports: No Symptoms





- Patient Data


Vitals - Most Recent: 


 Last Vital Signs











Temp  98.2 F   09/11/17 11:52


 


Pulse  96   09/11/17 11:52


 


Resp  16   09/11/17 11:52


 


BP  136/94 H  09/11/17 11:52


 


Pulse Ox  97   09/11/17 11:52











Weight - Most Recent: 118 lb 2.684 oz


I&O - Last 24 hours: 


 Intake & Output











 09/11/17 09/11/17 09/11/17





 03:59 11:59 19:59


 


Intake Total 1010 1685 1000


 


Balance 1010 1685 1000











Lab Results - Last 24 hrs: 


 Laboratory Results - last 24 hr











  09/11/17 09/11/17 Range/Units





  05:37 05:37 


 


WBC  12.30 H   (4.0-11.0)  K/uL


 


RBC  4.28 L   (4.30-5.90)  M/uL


 


Hgb  13.1   (12.0-16.0)  g/dL


 


Hct  37.9   (36.0-46.0)  %


 


MCV  88.6   (80.0-98.0)  fL


 


MCH  30.6   (27.0-32.0)  pg


 


MCHC  34.6   (31.0-37.0)  g/dL


 


RDW Std Deviation  45.1   (28.0-62.0)  fl


 


RDW Coeff of Gali  14   (11.0-15.0)  %


 


Plt Count  323   (150-400)  K/uL


 


MPV  9.50   (7.40-12.00)  fL


 


Neut % (Auto)  91.3 H   (48.0-80.0)  %


 


Lymph % (Auto)  5.9 L   (16.0-40.0)  %


 


Mono % (Auto)  2.8   (0.0-15.0)  %


 


Eos % (Auto)  0.0   (0.0-7.0)  %


 


Baso % (Auto)  0.0   (0.0-1.5)  %


 


Neut # (Auto)  11.2 H   (1.4-5.7)  K/uL


 


Lymph # (Auto)  0.7   (0.6-2.4)  K/uL


 


Mono # (Auto)  0.3   (0.0-0.8)  K/uL


 


Eos # (Auto)  0.0   (0.0-0.7)  K/uL


 


Baso # (Auto)  0.0   (0.0-0.1)  K/uL


 


Nucleated RBC %  0.0   /100WBC


 


Nucleated RBCs #  0   K/uL


 


Sodium   139  (136-146)  mmol/L


 


Potassium   3.7  (3.5-5.1)  mmol/L


 


Chloride   105  ()  mmol/L


 


Carbon Dioxide   25  (21-31)  mmol/L


 


BUN   10  (6.0-23.0)  mg/dL


 


Creatinine   0.7  (0.6-1.5)  mg/dL


 


Est Cr Clr Drug Dosing   107.57  mL/min


 


Estimated GFR (MDRD)   > 60.0  ml/min


 


Glucose   138 H  ()  mg/dL


 


Calcium   8.9  (8.8-10.8)  mg/dL











Med Orders - Current: 


 Current Medications





Hydrocodone Bitart/Acetaminophen (Norco 325-5 Mg)  0 tab PO Q4H PRN


   PRN Reason: Pain


   Last Admin: 09/11/17 12:57 Dose:  2 tab


Alprazolam (Xanax)  0.25 mg PO Q8H PRN


   PRN Reason: Anxiety


   Last Admin: 09/10/17 20:42 Dose:  0.25 mg


Hydrocortisone (Hydrocortisone 1% Crm)  30 gm TOP ASDIRECTED PRN


   PRN Reason: Itching


   Last Admin: 09/09/17 11:24 Dose:  1 applic


Hydromorphone HCl (Dilaudid)  1 mg IVPUSH Q2H PRN


   PRN Reason: Pain


   Last Admin: 09/10/17 18:47 Dose:  1 mg


Lactated Ringer's (Ringers, Lactated)  1,000 mls @ 125 mls/hr IV ASDIRECTED KEL


   Last Admin: 09/11/17 13:02 Dose:  125 mls/hr


Pantoprazole Sodium 40 mg/ (Sodium Chloride)  10 mls @ 300 mls/hr IVPUSH BID [facility]


   Last Admin: 09/11/17 09:26 Dose:  300 mls/hr


Methylprednisolone Sodium Succinate (Solu-Medrol)  125 mg IVPUSH Q8H [facility]


   Last Admin: 09/11/17 09:23 Dose:  125 mg


Metoclopramide HCl (Reglan)  10 mg IVPUSH Q6H [facility]


   Last Admin: 09/11/17 12:30 Dose:  10 mg


Promethazine HCl (Phenergan)  25 mg PO Q8H PRN


   PRN Reason: Nausea/Vomiting


   Last Admin: 09/10/17 16:40 Dose:  25 mg





Discontinued Medications





Hydrocodone Bitart/Acetaminophen (Norco 325-5 Mg)  1 - 2 tab PO Q4H PRN


   PRN Reason: Pain (moderate 4-6)


   Last Admin: 09/08/17 11:55 Dose:  2 tab


Hydromorphone HCl (Dilaudid)  1 mg IVPUSH Q1H PRN


   PRN Reason: Abdominal Pain


   Last Admin: 09/08/17 16:39 Dose:  1 mg


Hydromorphone HCl (Dilaudid Pca 6 Mg In Ns 30 Ml)  6 mg IV ASDIRECTED PRN; 

Protocol


   PRN Reason: Abdominal Pain


Hydromorphone HCl (Dilaudid Pca 6 Mg In Ns 30 Ml)  6 mg IV ASDIRECTED KEL


   PRN Reason: Protocol


   Last Admin: 09/10/17 02:40 Dose:  6 mg


Sodium Chloride (Normal Saline)  1,000 mls @ 999 mls/hr IV STAT ONE


   Stop: 09/07/17 16:49


   Last Admin: 09/07/17 16:12 Dose:  999 mls/hr


Prochlorperazine Edisylate 10 (mg/ Sodium Chloride)  52 mls @ 150 mls/hr IV 

ONETIME ONE


   Stop: 09/07/17 16:45


   Last Admin: 09/07/17 17:02 Dose:  150 mls/hr


Sodium Chloride (Normal Saline)  1,000 mls @ 999 mls/hr IV .Bolus ONE


   Stop: 09/07/17 19:17


   Last Admin: 09/07/17 18:20 Dose:  999 mls/hr


Pantoprazole Sodium 40 mg/ (Sodium Chloride)  10 mls @ 300 mls/hr IVPUSH Q10H 

KEL


   Last Admin: 09/08/17 17:47 Dose:  300 mls/hr


Iopamidol (Isovue Multipack-370 (76%))  70 ml IVPUSH ONETIME STA


   Stop: 09/07/17 16:49


   Last Admin: 09/07/17 16:48 Dose:  70 ml


Lorazepam (Ativan)  2 mg IVPUSH ONETIME ONE


   Stop: 09/07/17 16:24


   Last Admin: 09/07/17 16:44 Dose:  2 mg


Morphine Sulfate (Morphine)  2 mg IV ONETIME ONE


   Stop: 09/07/17 15:50


   Last Admin: 09/07/17 16:03 Dose:  2 mg


Morphine Sulfate (Morphine)  0 mg IVPUSH Q1H PRN


   PRN Reason: Pain (severe 7-10)


Ondansetron HCl (Zofran)  4 mg IVPUSH ONETIME ONE


   Stop: 09/07/17 15:50


   Last Admin: 09/07/17 16:03 Dose:  4 mg


Ondansetron HCl (Zofran)  4 mg IVPUSH Q6H PRN


   PRN Reason: Nausea/Vomiting


   Last Admin: 09/08/17 16:31 Dose:  4 mg


Ondansetron HCl (Zofran)  8 mg IVPUSH Q4H PRN


   PRN Reason: Nausea/Vomiting


   Last Admin: 09/09/17 09:04 Dose:  8 mg


Prednisone (Prednisone)  40 mg PO WITHBREAKFAST KEL


   Last Admin: 09/08/17 18:22 Dose:  Not Given











- Exam


General: Reports: Alert, Oriented, Cooperative, No Acute Distress


HEENT: Reports: Pupils Equal, Pupils Reactive.  Denies: Scleral Icterus


Neck: Reports: Supple


Lungs: Reports: Clear to Auscultation, Normal Respiratory Effort


Cardiovascular: Reports: Regular Rate, Regular Rhythm.  Denies: Tachycardia


GI/Abdominal Exam: Normal Bowel Sounds, Soft, No Mass.  No: Guarding, Rigid, 

Rebound, Tender (RLQ), Hernia


 (Female) Exam: Deferred


Rectal (Female) Exam: Deferred


Back Exam: Reports: Normal Inspection


Extremities: Normal Inspection


Skin: Reports: Warm, Dry, Intact


Wound/Incisions: Reports: Healing Well, Dressing Dry and Intact


Neurological: Reports: No New Focal Deficit


Psy/Mental Status: Reports: Alert, Normal Affect, Anxious





*Q Meaningful Use (DIS)





- VTE *Q


VTE Criteria *Q: 








- Stroke *Q


Stroke Criteria *Q: 








- AMI *Q


AMI Criteria *Q:

## 2018-04-12 ENCOUNTER — HOSPITAL ENCOUNTER (EMERGENCY)
Dept: HOSPITAL 56 - MW.ED | Age: 23
LOS: 1 days | Discharge: HOME | End: 2018-04-13
Payer: COMMERCIAL

## 2018-04-12 VITALS — SYSTOLIC BLOOD PRESSURE: 104 MMHG | DIASTOLIC BLOOD PRESSURE: 65 MMHG

## 2018-04-12 DIAGNOSIS — R10.31: Primary | ICD-10-CM

## 2018-04-12 DIAGNOSIS — T38.5X5A: ICD-10-CM

## 2018-04-12 DIAGNOSIS — R10.32: ICD-10-CM

## 2018-04-12 DIAGNOSIS — Z88.8: ICD-10-CM

## 2018-04-12 DIAGNOSIS — Z79.899: ICD-10-CM

## 2018-04-12 PROCEDURE — 96375 TX/PRO/DX INJ NEW DRUG ADDON: CPT

## 2018-04-12 PROCEDURE — 36415 COLL VENOUS BLD VENIPUNCTURE: CPT

## 2018-04-12 PROCEDURE — 99284 EMERGENCY DEPT VISIT MOD MDM: CPT

## 2018-04-12 PROCEDURE — 81001 URINALYSIS AUTO W/SCOPE: CPT

## 2018-04-12 PROCEDURE — 96374 THER/PROPH/DIAG INJ IV PUSH: CPT

## 2018-04-12 PROCEDURE — 84703 CHORIONIC GONADOTROPIN ASSAY: CPT

## 2018-04-12 PROCEDURE — 83690 ASSAY OF LIPASE: CPT

## 2018-04-12 PROCEDURE — 96361 HYDRATE IV INFUSION ADD-ON: CPT

## 2018-04-12 PROCEDURE — 74019 RADEX ABDOMEN 2 VIEWS: CPT

## 2018-04-12 PROCEDURE — 85025 COMPLETE CBC W/AUTO DIFF WBC: CPT

## 2018-04-12 PROCEDURE — 80053 COMPREHEN METABOLIC PANEL: CPT

## 2018-04-12 PROCEDURE — 80305 DRUG TEST PRSMV DIR OPT OBS: CPT

## 2018-04-12 PROCEDURE — 86140 C-REACTIVE PROTEIN: CPT

## 2018-04-12 NOTE — EDM.PDOC
ED HPI GENERAL MEDICAL PROBLEM





- General


Chief Complaint: OB/GYN Problem


Stated Complaint: ABDOMINAL PAIN FROM BIRTH CONTROL


Time Seen by Provider: 04/12/18 23:24


Source of Information: Reports: Patient





- History of Present Illness


INITIAL COMMENTS - FREE TEXT/NARRATIVE: 





HISTORY AND PHYSICAL:


History of present illness:


[Patient presents with abdominal pain nausea and vomiting which began several 

hours after receiving the Mirena IUD





Patient has vomited several times here in the ER and is quite dramatic having 

pain is over exaggerated, she rates pain 10 out of 10 nonradiating and nonfocal 

no fever chills sweats no chest pain shortness breath headache dizziness 

palpitation no bowel or urine symptoms





Patient has been reluctant to provide ua, hence catheterization performed


]


Review of systems: 


As per history of present illness and below otherwise all systems reviewed and 

negative.


Past medical history: 


As per history of present illness and as reviewed below otherwise 

noncontributory.


Surgical history: 


As per history of present illness and as reviewed below otherwise 

noncontributory.


Social history: 


No reported history of drug or alcohol abuse.


Family history: 


As per history of present illness and as reviewed below otherwise 

noncontributory.





Physical exam:


HEENT: Atraumatic, normocephalic, pupils reactive, negative for conjunctival 

pallor or scleral icterus, mucous membranes moist, throat clear, neck supple, 

nontender, trachea midline.


Lungs: Clear to auscultation, breath sounds equal bilaterally, chest nontender.


Heart: S1S2, regular, negative for clicks, rubs, or JVD.


Abdomen: Soft, nondistended, nontender. Negative for masses or 

hepatosplenomegaly. Negative for costovertebral tenderness.


Pelvis: Stable nontender.


Genitourinary: Deferred.


Rectal: Deferred.


Extremities: Atraumatic, negative for cords or calf pain. Neurovascular 

unremarkable.


Neuro: Awake, alert, oriented. Cranial nerves II through XII unremarkable. 

Cerebellum unremarkable. Motor and sensory unremarkable throughout. Exam 

nonfocal.





Diagnostics:


[CBC CMP lipase UA hCG


Abdomen flat and upright


]Orthostatic vital signs





Therapeutics:


[1 L normal saline bolus


Toradol 30 mg IV


]Zofran 8 mg IV





Toradol


Zofran





Impression: 


[Abdominal pain, nausea, vomiting--resolved


 common side effects secondary to Mirena


Definitiveposition and diagnosis as appropriate pending reevaluation and review 

of above.


  ** Bilateral Lower Abdomen


Pain Score (Numeric/FACES): 8





- Related Data


 Allergies











Allergy/AdvReac Type Severity Reaction Status Date / Time


 


diphenhydramine Allergy  Itching Verified 04/12/18 23:21





[From Benadryl]     











Home Meds: 


 Home Meds





ALPRAZolam [Xanax] 0.5 mg PO TID PRN 04/12/18 [History]


Albuterol [Proair HFA] PRN 04/12/18 [History]


Budesonide/Formoterol Fumarate [Symbicort 160-4.5 Mcg Inhaler] INH BID 04/12/18 

[History]


Citalopram Hydrobromide [Celexa] 40 mg PO DAILY 04/12/18 [History]











Past Medical History





- Past Health History


Medical/Surgical History: Denies Medical/Surgical History


HEENT History: Reports: None


Cardiovascular History: Reports: None


Respiratory History: Reports: Other (See Below)


Other Respiratory History: occas wheezing, has taken inhalers in the past...no 

diagnosis


Gastrointestinal History: Reports: None, Other (See Below)


Genitourinary History: Reports: None


OB/GYN History: Reports: None


Musculoskeletal History: Reports: None


Neurological History: Reports: None


Psychiatric History: Reports: Anxiety, Depression, Panic Attack, PTSD


Other Psychiatric History: post traumatic stress disorder, conversion disorder


Endocrine/Metabolic History: Reports: None


Hematologic History: Reports: Blood Transfusion(s)


Other Hematologic History: as a baby





- Infectious Disease History


Infectious Disease History: Reports: None





- Past Surgical History


HEENT Surgical History: Reports: Adenoidectomy, Tonsillectomy


Other HEENT Surgeries/Procedures: addenoidectomy


GI Surgical History: Reports: Appendectomy


Female  Surgical History: Reports: None





Social & Family History





- Family History


Family Medical History: Noncontributory





- Tobacco Use


Smoking Status *Q: Never Smoker


Second Hand Smoke Exposure: No





- Caffeine Use


Caffeine Use: Reports: None





- Alcohol Use


Days Per Week of Alcohol Use: 3


Number of Drinks Per Day: 3


Total Drinks Per Week: 9





- Recreational Drug Use


Recreational Drug Use: No


Drug Use in Last 12 Months: No





ED ROS GENERAL





- Review of Systems


Review Of Systems: ROS reveals no pertinent complaints other than HPI.





ED EXAM, GENERAL





- Physical Exam


Exam: See Below





Course





- Vital Signs


Last Recorded V/S: 


 Last Vital Signs











Temp  98.0 F   04/12/18 23:17


 


Pulse  83   04/12/18 23:17


 


Resp  18   04/12/18 23:17


 


BP  104/65   04/12/18 23:17


 


Pulse Ox  98   04/12/18 23:17








 





Orthostatic Blood Pressure [     123/79


Standing]                        


Orthostatic Blood Pressure [     116/77


Sitting]                         


Orthostatic Blood Pressure [     109/69


Supine]                          











- Orders/Labs/Meds


Orders: 


 Active Orders 24 hr











 Category Date Time Status


 


 Orthostatic Vital Signs [RC] ASDIRECTED Care  04/13/18 01:10 Active


 


 Abdomen 2V AP Flat Upright [CR] Stat Exams  04/13/18 00:00 Taken


 


 DRUG SCREEN, URINE [URCHEM] Stat Lab  04/13/18 01:34 Ordered











Labs: 


 Laboratory Tests











  04/12/18 04/12/18 04/12/18 Range/Units





  01:34 23:45 23:45 


 


WBC   8.12   (4.0-11.0)  K/uL


 


RBC   4.59   (4.30-5.90)  M/uL


 


Hgb   14.1   (12.0-16.0)  g/dL


 


Hct   40.8   (36.0-46.0)  %


 


MCV   88.9   (80.0-98.0)  fL


 


MCH   30.7   (27.0-32.0)  pg


 


MCHC   34.6   (31.0-37.0)  g/dL


 


RDW Std Deviation   43.1   (28.0-62.0)  fl


 


RDW Coeff of Gali   13   (11.0-15.0)  %


 


Plt Count   290   (150-400)  K/uL


 


MPV   9.30   (7.40-12.00)  fL


 


Neut % (Auto)   76.5   (48.0-80.0)  %


 


Lymph % (Auto)   17.7   (16.0-40.0)  %


 


Mono % (Auto)   4.8   (0.0-15.0)  %


 


Eos % (Auto)   0.9   (0.0-7.0)  %


 


Baso % (Auto)   0.1   (0.0-1.5)  %


 


Neut # (Auto)   6.2 H   (1.4-5.7)  K/uL


 


Lymph # (Auto)   1.4   (0.6-2.4)  K/uL


 


Mono # (Auto)   0.4   (0.0-0.8)  K/uL


 


Eos # (Auto)   0.1   (0.0-0.7)  K/uL


 


Baso # (Auto)   0.0   (0.0-0.1)  K/uL


 


Nucleated RBC %   0.0   /100WBC


 


Nucleated RBCs #   0   K/uL


 


Sodium    141  (136-145)  mmol/L


 


Potassium    3.9  (3.5-5.1)  mmol/L


 


Chloride    105  ()  mmol/L


 


Carbon Dioxide    23.3  (21.0-32.0)  mmol/L


 


BUN    11  (7.0-18.0)  mg/dL


 


Creatinine    0.8  (0.6-1.0)  mg/dL


 


Est Cr Clr Drug Dosing    89.91  mL/min


 


Estimated GFR (MDRD)    > 60.0  ml/min


 


Glucose    108 H  ()  mg/dL


 


Calcium    9.1  (8.5-10.1)  mg/dL


 


Total Bilirubin    0.3  (0.2-1.0)  mg/dL


 


AST    27  (15-37)  IU/L


 


ALT    47  (14-63)  IU/L


 


Alkaline Phosphatase    53  ()  U/L


 


C-Reactive Protein     (0.00-0.90)  mg/dL


 


Total Protein    7.2  (6.4-8.2)  g/dL


 


Albumin    4.2  (3.4-5.0)  g/dL


 


Globulin    3.0  (2.0-3.5)  g/dL


 


Albumin/Globulin Ratio    1.4  (1.3-2.8)  


 


Lipase    56 L  ()  U/L


 


HCG, Qual     (NEG)  


 


Urine Color  YELLOW    


 


Urine Appearance  CLEAR    


 


Urine pH  6.0    (5.0-8.0)  


 


Ur Specific Gravity  >= 1.030    (1.001-1.035)  


 


Urine Protein  TRACE    (NEGATIVE)  mg/dL


 


Urine Glucose (UA)  NEGATIVE    (NEGATIVE)  mg/dL


 


Urine Ketones  15 H    (NEGATIVE)  mg/dL


 


Urine Occult Blood  NEGATIVE    (NEGATIVE)  


 


Urine Nitrite  NEGATIVE    (NEGATIVE)  


 


Urine Bilirubin  NEGATIVE    (NEGATIVE)  


 


Urine Urobilinogen  0.2    (<2.0)  EU/dL


 


Ur Leukocyte Esterase  NEGATIVE    (NEGATIVE)  


 


Urine RBC  2-3    (0-2/HPF)  


 


Urine WBC  1-3    (0-5/HPF)  


 


Ur Epithelial Cells  FEW    (NONE-FEW)  


 


Urine Bacteria  FEW    (NEGATIVE)  


 


Urine Mucus  MODERATE    (NONE-MOD)  


 


Urine Opiates Screen     (NEGATIVE)  


 


Ur Oxycodone Screen     (NEGATIVE)  


 


Urine Methadone Screen     (NEGATIVE)  


 


Ur Barbiturates Screen     (NEGATIVE)  


 


Ur Phencyclidine Scrn     (NEGATIVE)  


 


Ur Amphetamine Screen     (NEGATIVE)  


 


U Methamphetamines Scrn     (NEGATIVE)  


 


U Benzodiazepines Scrn     (NEGATIVE)  


 


U Cocaine Metab Screen     (NEGATIVE)  


 


U Marijuana (THC) Screen     (NEGATIVE)  














  04/12/18 04/12/18 04/13/18 Range/Units





  23:45 23:45 01:34 


 


WBC     (4.0-11.0)  K/uL


 


RBC     (4.30-5.90)  M/uL


 


Hgb     (12.0-16.0)  g/dL


 


Hct     (36.0-46.0)  %


 


MCV     (80.0-98.0)  fL


 


MCH     (27.0-32.0)  pg


 


MCHC     (31.0-37.0)  g/dL


 


RDW Std Deviation     (28.0-62.0)  fl


 


RDW Coeff of Gali     (11.0-15.0)  %


 


Plt Count     (150-400)  K/uL


 


MPV     (7.40-12.00)  fL


 


Neut % (Auto)     (48.0-80.0)  %


 


Lymph % (Auto)     (16.0-40.0)  %


 


Mono % (Auto)     (0.0-15.0)  %


 


Eos % (Auto)     (0.0-7.0)  %


 


Baso % (Auto)     (0.0-1.5)  %


 


Neut # (Auto)     (1.4-5.7)  K/uL


 


Lymph # (Auto)     (0.6-2.4)  K/uL


 


Mono # (Auto)     (0.0-0.8)  K/uL


 


Eos # (Auto)     (0.0-0.7)  K/uL


 


Baso # (Auto)     (0.0-0.1)  K/uL


 


Nucleated RBC %     /100WBC


 


Nucleated RBCs #     K/uL


 


Sodium     (136-145)  mmol/L


 


Potassium     (3.5-5.1)  mmol/L


 


Chloride     ()  mmol/L


 


Carbon Dioxide     (21.0-32.0)  mmol/L


 


BUN     (7.0-18.0)  mg/dL


 


Creatinine     (0.6-1.0)  mg/dL


 


Est Cr Clr Drug Dosing     mL/min


 


Estimated GFR (MDRD)     ml/min


 


Glucose     ()  mg/dL


 


Calcium     (8.5-10.1)  mg/dL


 


Total Bilirubin     (0.2-1.0)  mg/dL


 


AST     (15-37)  IU/L


 


ALT     (14-63)  IU/L


 


Alkaline Phosphatase     ()  U/L


 


C-Reactive Protein   <0.20   (0.00-0.90)  mg/dL


 


Total Protein     (6.4-8.2)  g/dL


 


Albumin     (3.4-5.0)  g/dL


 


Globulin     (2.0-3.5)  g/dL


 


Albumin/Globulin Ratio     (1.3-2.8)  


 


Lipase     ()  U/L


 


HCG, Qual  NEGATIVE    (NEG)  


 


Urine Color     


 


Urine Appearance     


 


Urine pH     (5.0-8.0)  


 


Ur Specific Gravity     (1.001-1.035)  


 


Urine Protein     (NEGATIVE)  mg/dL


 


Urine Glucose (UA)     (NEGATIVE)  mg/dL


 


Urine Ketones     (NEGATIVE)  mg/dL


 


Urine Occult Blood     (NEGATIVE)  


 


Urine Nitrite     (NEGATIVE)  


 


Urine Bilirubin     (NEGATIVE)  


 


Urine Urobilinogen     (<2.0)  EU/dL


 


Ur Leukocyte Esterase     (NEGATIVE)  


 


Urine RBC     (0-2/HPF)  


 


Urine WBC     (0-5/HPF)  


 


Ur Epithelial Cells     (NONE-FEW)  


 


Urine Bacteria     (NEGATIVE)  


 


Urine Mucus     (NONE-MOD)  


 


Urine Opiates Screen    NEGATIVE  (NEGATIVE)  


 


Ur Oxycodone Screen    NEGATIVE  (NEGATIVE)  


 


Urine Methadone Screen    NEGATIVE  (NEGATIVE)  


 


Ur Barbiturates Screen    NEGATIVE  (NEGATIVE)  


 


Ur Phencyclidine Scrn    NEGATIVE  (NEGATIVE)  


 


Ur Amphetamine Screen    NEGATIVE  (NEGATIVE)  


 


U Methamphetamines Scrn    NEGATIVE  (NEGATIVE)  


 


U Benzodiazepines Scrn    POSITIVE  (NEGATIVE)  


 


U Cocaine Metab Screen    NEGATIVE  (NEGATIVE)  


 


U Marijuana (THC) Screen    POSITIVE  (NEGATIVE)  











Meds: 


Medications














Discontinued Medications














Generic Name Dose Route Start Last Admin





  Trade Name Freq  PRN Reason Stop Dose Admin


 


Sodium Chloride  1,000 mls @ 999 mls/hr  04/13/18 00:06  04/13/18 00:20





  Normal Saline  IV  04/13/18 01:06  999 mls/hr





  STAT ONE   Administration





     





     





     





     


 


Ketorolac Tromethamine  30 mg  04/13/18 00:06  04/13/18 00:21





  Toradol  IVPUSH  04/13/18 00:07  30 mg





  ONETIME ONE   Administration





     





     





     





     


 


Ondansetron HCl  8 mg  04/13/18 00:06  04/13/18 00:21





  Zofran  IVPUSH  04/13/18 00:07  8 mg





  ONETIME ONE   Administration





     





     





     





     














Departure





- Departure


Time of Disposition: 03:02


Disposition: Home, Self-Care 01


Condition: Good


Clinical Impression: 


 Abdominal pain, Side effect of medication








- Discharge Information


Referrals: 


Esther Moise MD [Primary Care Provider] - 


Forms:  ED Department Discharge


Additional Instructions: 


The following information is given to patients seen in the emergency department 

who are being discharged to home. This information is to outline your options 

for follow-up care. We provide all patients seen in our emergency department 

with a follow-up referral.





The need for follow-up, as well as the timing and circumstances, are variable 

depending upon the specifics of your emergency department visit.





If you don't have a primary care physician on staff, we will provide you with a 

referral. We always advise you to contact your personal physician following an 

emergency department visit to inform them of the circumstance of the visit and 

for follow-up with them and/or the need for any referrals to a consulting 

specialist.





The emergency department will also refer you to a specialist when appropriate. 

This referral assures that you have the opportunity for follow-up care with a 

specialist. All of these measure are taken in an effort to provide you with 

optimal care, which includes your follow-up.





Under all circumstances we always encourage you to contact your private 

physician who remains a resource for coordinating your care. When calling for 

follow-up care, please make the office aware that this follow-up is from your 

recent emergency room visit. If for any reason you are refused follow-up, 

please contact the [facility] emergency department at [phone] 

and asked to speak to the emergency department charge nurse.








- My Orders


Last 24 Hours: 


My Active Orders





04/13/18 00:00


Abdomen 2V AP Flat Upright [CR] Stat 





04/13/18 01:10


Orthostatic Vital Signs [RC] ASDIRECTED 





04/13/18 01:34


DRUG SCREEN, URINE [URCHEM] Stat 














- Assessment/Plan


Last 24 Hours: 


My Active Orders





04/13/18 00:00


Abdomen 2V AP Flat Upright [CR] Stat 





04/13/18 01:10


Orthostatic Vital Signs [RC] ASDIRECTED 





04/13/18 01:34


DRUG SCREEN, URINE [URCHEM] Stat

## 2018-04-13 LAB
CHLORIDE SERPL-SCNC: 105 MMOL/L (ref 98–107)
SODIUM SERPL-SCNC: 141 MMOL/L (ref 136–145)

## 2018-05-19 ENCOUNTER — HOSPITAL ENCOUNTER (EMERGENCY)
Dept: HOSPITAL 56 - MW.ED | Age: 23
LOS: 1 days | Discharge: LEFT BEFORE DISCHARGE | End: 2018-05-20
Payer: COMMERCIAL

## 2018-05-19 ENCOUNTER — HOSPITAL ENCOUNTER (EMERGENCY)
Dept: HOSPITAL 56 - MW.ED | Age: 23
Discharge: HOME | End: 2018-05-19
Payer: COMMERCIAL

## 2018-05-19 VITALS — SYSTOLIC BLOOD PRESSURE: 117 MMHG | DIASTOLIC BLOOD PRESSURE: 86 MMHG

## 2018-05-19 VITALS — SYSTOLIC BLOOD PRESSURE: 132 MMHG | DIASTOLIC BLOOD PRESSURE: 93 MMHG

## 2018-05-19 DIAGNOSIS — Z88.8: ICD-10-CM

## 2018-05-19 DIAGNOSIS — F41.0: ICD-10-CM

## 2018-05-19 DIAGNOSIS — Z79.899: ICD-10-CM

## 2018-05-19 DIAGNOSIS — K29.00: Primary | ICD-10-CM

## 2018-05-19 LAB
CHLORIDE SERPL-SCNC: 103 MMOL/L (ref 98–107)
SODIUM SERPL-SCNC: 139 MMOL/L (ref 136–145)

## 2018-05-19 PROCEDURE — 74019 RADEX ABDOMEN 2 VIEWS: CPT

## 2018-05-19 PROCEDURE — 85025 COMPLETE CBC W/AUTO DIFF WBC: CPT

## 2018-05-19 PROCEDURE — 80053 COMPREHEN METABOLIC PANEL: CPT

## 2018-05-19 PROCEDURE — 81001 URINALYSIS AUTO W/SCOPE: CPT

## 2018-05-19 PROCEDURE — 96375 TX/PRO/DX INJ NEW DRUG ADDON: CPT

## 2018-05-19 PROCEDURE — 96361 HYDRATE IV INFUSION ADD-ON: CPT

## 2018-05-19 PROCEDURE — 99284 EMERGENCY DEPT VISIT MOD MDM: CPT

## 2018-05-19 PROCEDURE — 86677 HELICOBACTER PYLORI ANTIBODY: CPT

## 2018-05-19 PROCEDURE — 96374 THER/PROPH/DIAG INJ IV PUSH: CPT

## 2018-05-19 PROCEDURE — 36415 COLL VENOUS BLD VENIPUNCTURE: CPT

## 2018-05-19 PROCEDURE — 99283 EMERGENCY DEPT VISIT LOW MDM: CPT

## 2018-05-19 NOTE — EDM.PDOC
ED HPI GENERAL MEDICAL PROBLEM





- General


Chief Complaint: Abdominal Pain


Stated Complaint: UNKNOWN


Time Seen by Provider: 05/19/18 20:19


Source of Information: Reports: Patient


History Limitations: Reports: No Limitations





- History of Present Illness


INITIAL COMMENTS - FREE TEXT/NARRATIVE: 





HISTORY AND PHYSICAL:


[]22-year-old female presenting with one episode of vomiting and abdominal pain





History of Present Illness:


[]Patient was seen previously in the emergency room this morning she had 

improved and was discharged


States that her panic and anxiety is worse. She is  trying to calm herself 

down. She is stating that she is trying not to take her Xanax / Ativan she does 

not want to take too much of this medication.


She has not taken her regular medications.





Review of Systems:


As per history of present illness and below otherwise all 


systems reviewed and negative.  





Past medical history:


As per history of present illness and as reviewed below


otherwise noncontributory.





Surgical history:


As per history of present illness and as reviewed below


otherwise noncontributory.





Social history:


No reported history of drug or alcohol abuse.





Family history:


As per history of present illness and as reviewed below


otherwise noncontributory.





Physical exam:


Alert and oriented female answering questions appropriately and rapid pulse and 

as he is crying as she is speaking. Pointing to her midepigastric area as the 

area where her pain is occurring. Denies any burning at this time just some 

cramping type pain.


HEENT: Atraumatic, normocehpalic, pupils reactive, negative for conjunctival 

pallor or scleral icterus, mucous membranes moist, throat clear, neck supple, 

nontender, trachea midline.  


Lungs: Clear to auscultation, breath sounds equal bilaterally, chest non 

tender.  


Heart: S1S2, regular, negative for clicks, rubs, or JVD.


Abdomen: Soft, nondistended, mildly tender. No rebound or guarding. Negative 

for masses or hepatossplenmegaly. Negative for costovertebral tenderness.


Pelvis: Stable nontender.


Genitourinary: Deferred.


Rectal: Deferred


Extremities: Atraumatic, negative for cords or calf pain.  


Neurovascular unremarkable.


Neuro:  Awake, alert, oriented.  Cranial nerves II through XII


unremarkable.  Cerebellum unremarkable.  Motor and sensory unremarkable 

throughout.  Exam nonfocal.  


Patient improved after GI cocktail was given





While awaiting further cares patient eloped





Diagnostics:


[]UA





Therapeutics:


[]GI Cocktail


Zofran ODT








Impression:


[]Panic disorder


Chronic abdominal pain





Plan:


[]





Definitive disposition and diagnosis as appropriate pending


reevaluation and review of above.  





Onset: Gradual


Duration: Chronic


Location: Reports: Abdomen


Quality: Reports: Ache, Same as Previous Episode


Severity: Mild


Improves with: Reports: None


Worsens with: Reports: None


  ** epigastric


Pain Score (Numeric/FACES): 10





- Related Data


 Allergies











Allergy/AdvReac Type Severity Reaction Status Date / Time


 


diphenhydramine Allergy  Itching Verified 05/19/18 20:14





[From Benadryl]     











Home Meds: 


 Home Meds





ALPRAZolam [Xanax] 0.5 mg PO TID PRN 04/12/18 [History]


Albuterol [Proair HFA] 1 puff INH ASDIRECTED PRN 04/12/18 [History]


Budesonide/Formoterol Fumarate [Symbicort 160-4.5 Mcg Inhaler] 1 puff INH BID 04 /12/18 [History]


Citalopram Hydrobromide [Celexa] 40 mg PO DAILY 04/12/18 [History]











Past Medical History





- Past Health History


Medical/Surgical History: Denies Medical/Surgical History


HEENT History: Reports: None


Cardiovascular History: Reports: None


Respiratory History: Reports: Other (See Below)


Other Respiratory History: occas wheezing, has taken inhalers in the past...no 

diagnosis


Gastrointestinal History: Reports: Gastritis, Other (See Below)


Other Gastrointestinal History: chronic stomach problems and vomiting


Genitourinary History: Reports: None


OB/GYN History: Reports: None


Musculoskeletal History: Reports: None


Neurological History: Reports: None


Psychiatric History: Reports: Anxiety, Depression, Panic Attack, PTSD


Other Psychiatric History: post traumatic stress disorder, conversion disorder


Endocrine/Metabolic History: Reports: None


Hematologic History: Reports: Blood Transfusion(s)


Other Hematologic History: as a baby





- Infectious Disease History


Infectious Disease History: Reports: None





- Past Surgical History


HEENT Surgical History: Reports: Adenoidectomy, Tonsillectomy


Other HEENT Surgeries/Procedures: addenoidectomy


GI Surgical History: Reports: Appendectomy


Female  Surgical History: Reports: None





Social & Family History





- Family History


Family Medical History: Noncontributory





- Tobacco Use


Smoking Status *Q: Never Smoker





- Caffeine Use


Caffeine Use: Reports: Coffee





- Recreational Drug Use


Recreational Drug Use: No





ED ROS GENERAL





- Review of Systems


Review Of Systems: ROS reveals no pertinent complaints other than HPI.





ED EXAM, GI/ABD





- Physical Exam


Exam: See Below (see dictation)





Course





- Vital Signs


Last Recorded V/S: 


 Last Vital Signs











Temp  36.6 C   05/19/18 19:40


 


Pulse  100   05/19/18 19:40


 


Resp  26 H  05/19/18 19:40


 


BP  117/86   05/19/18 19:40


 


Pulse Ox  100   05/19/18 19:40














- Orders/Labs/Meds


Orders: 


 Active Orders 24 hr











 Category Date Time Status


 


 UA W/MICROSCOPIC [URIN] Stat Lab  05/19/18 20:12 Ordered











Labs: 


 Laboratory Tests











  05/19/18 Range/Units





  20:12 


 


Urine Color  YELLOW  


 


Urine Appearance  HAZY  


 


Urine pH  7.0  (5.0-8.0)  


 


Ur Specific Gravity  1.015  (1.001-1.035)  


 


Urine Protein  TRACE  (NEGATIVE)  mg/dL


 


Urine Glucose (UA)  NEGATIVE  (NEGATIVE)  mg/dL


 


Urine Ketones  >=80  (NEGATIVE)  mg/dL


 


Urine Occult Blood  LARGE H  (NEGATIVE)  


 


Urine Nitrite  NEGATIVE  (NEGATIVE)  


 


Urine Bilirubin  NEGATIVE  (NEGATIVE)  


 


Urine Urobilinogen  0.2  (<2.0)  EU/dL


 


Ur Leukocyte Esterase  NEGATIVE  (NEGATIVE)  


 


Urine RBC  10-12  (0-2/HPF)  


 


Urine WBC  1-2  (0-5/HPF)  


 


Ur Epithelial Cells  FEW  (NONE-FEW)  


 


Amorphous Sediment  MODERATE  (NEGATIVE)  


 


Urine Bacteria  1+ H  (NEGATIVE)  











Meds: 


Medications














Discontinued Medications














Generic Name Dose Route Start Last Admin





  Trade Name Freq  PRN Reason Stop Dose Admin


 


Al Hydroxide/Mg Hydroxide 15  0 ml  05/19/18 20:18  05/19/18 20:35





  ml/ Lidocaine HCl 5 ml  PO  05/19/18 20:19  20 each





  ONETIME ONE   Administration





     





     





     





     


 


Ondansetron HCl  4 mg  05/19/18 20:18  05/19/18 20:33





  Zofran Odt  PO  05/19/18 20:19  4 mg





  ONETIME ONE   Administration





     





     





     





     


 


Pantoprazole Sodium  80 mg  05/19/18 21:56  





  Protonix Iv***  IVPUSH  05/19/18 21:57  





  .BOLUS ONE   





     





     





     





     














Departure





- Departure


Time of Disposition: 10:02


Disposition: Eloped 07


Condition: Good


Clinical Impression: 


 Abdominal pain, Panic disorder, Acute gastritis








- Discharge Information


Referrals: 


PCP,None [Primary Care Provider] - 


Forms:  ED Department Discharge





- My Orders


Last 24 Hours: 


My Active Orders





05/19/18 20:12


UA W/MICROSCOPIC [URIN] Stat 














- Assessment/Plan


Last 24 Hours: 


My Active Orders





05/19/18 20:12


UA W/MICROSCOPIC [URIN] Stat

## 2018-05-21 ENCOUNTER — HOSPITAL ENCOUNTER (EMERGENCY)
Dept: HOSPITAL 56 - MW.ED | Age: 23
Discharge: HOME | End: 2018-05-21
Payer: COMMERCIAL

## 2018-05-21 VITALS — SYSTOLIC BLOOD PRESSURE: 132 MMHG | DIASTOLIC BLOOD PRESSURE: 88 MMHG

## 2018-05-21 DIAGNOSIS — F41.9: ICD-10-CM

## 2018-05-21 DIAGNOSIS — F32.9: ICD-10-CM

## 2018-05-21 DIAGNOSIS — Z88.8: ICD-10-CM

## 2018-05-21 DIAGNOSIS — Z79.899: ICD-10-CM

## 2018-05-21 DIAGNOSIS — R10.11: Primary | ICD-10-CM

## 2018-05-21 LAB
CHLORIDE SERPL-SCNC: 103 MMOL/L (ref 98–107)
SODIUM SERPL-SCNC: 137 MMOL/L (ref 136–145)

## 2018-05-21 PROCEDURE — 96361 HYDRATE IV INFUSION ADD-ON: CPT

## 2018-05-21 PROCEDURE — 99284 EMERGENCY DEPT VISIT MOD MDM: CPT

## 2018-05-21 PROCEDURE — 80053 COMPREHEN METABOLIC PANEL: CPT

## 2018-05-21 PROCEDURE — 96375 TX/PRO/DX INJ NEW DRUG ADDON: CPT

## 2018-05-21 PROCEDURE — 96374 THER/PROPH/DIAG INJ IV PUSH: CPT

## 2018-05-21 PROCEDURE — 36415 COLL VENOUS BLD VENIPUNCTURE: CPT

## 2018-05-21 PROCEDURE — 83690 ASSAY OF LIPASE: CPT

## 2018-05-21 PROCEDURE — 81001 URINALYSIS AUTO W/SCOPE: CPT

## 2018-05-21 PROCEDURE — 81025 URINE PREGNANCY TEST: CPT

## 2018-05-21 PROCEDURE — 85025 COMPLETE CBC W/AUTO DIFF WBC: CPT

## 2018-05-21 NOTE — CR
EXAM DATE: 18



PATIENT'S AGE: 22





Patient: CINDY VELOZ



Facility: [place], [place]

Patient ID: [id number]

Site Patient ID: [id number].

Site Accession #: [id number].

: 1995

Study: XRay Abdomen [id number]/19/2018 11:54:20 AM

Ordering Physician: Doctor Queen



Final Report: 

Pain two-view abdomen.



FINDINGS:

Air within nondilated loops of bowel to l the level of the rectum. IUD in the 
pelvis. No abnormal masses calcifications. No free air.



IMPRESSION:

1. Non obstructive bowel gas pattern.





Dictated by Nirali Mccabe MD @ May 19 2018 12:15PM

(Electronic Signature)



Report Signed by Proxy.
MTDD

## 2018-05-21 NOTE — EDM.PDOC
ED HPI GENERAL MEDICAL PROBLEM





- General


Chief Complaint: Abdominal Pain


Stated Complaint: GALLBLADDER


Time Seen by Provider: 05/21/18 10:58


Source of Information: Reports: Patient





- History of Present Illness


INITIAL COMMENTS - FREE TEXT/NARRATIVE: 





HISTORY AND PHYSICAL:


History of present illness:


[Patient presents with "gallbladder pain"  she states she was seen in mind that 

yesterday she was diagnosed with at least biliary colic and that they had 

performed an ultrasound had some findings although she has normal ultrasound 

here last fall as well as multiple normal CT scans performed because of recent 

and completely normal.





She had originally told me that a HIDA scan was performed in [place] later has 

recanted this her friends/employers also with her and states that she has heard 

several different versions of this as well but she does have follow-up 

appointment with Dr. De La Garza general surgery in mind that tomorrow as well as a 

GI specialist scheduled in [place]





Currently no fever nausea vomiting chills sweats no chest pain shortness breath 

headache dizziness palpitation no bowel or urine symptoms


She does complain of abdominal pain 8 out of 10 right upper quadrant on arrival 

this has improved with below treatment





She has prescriptions provided by mary fleming she is filling at [facility] 

[facility] including Zofran pain medicine and proton X apparently


Review of systems: 


As per history of present illness and below otherwise all systems reviewed and 

negative.


Past medical history: 


As per history of present illness and as reviewed below otherwise 

noncontributory.


Surgical history: 


As per history of present illness and as reviewed below otherwise 

noncontributory.


Social history: 


No reported history of drug or alcohol abuse.


Family history: 


As per history of present illness and as reviewed below otherwise 

noncontributory.


Physical exam:


HEENT: Atraumatic, normocephalic, pupils reactive, negative for conjunctival 

pallor or scleral icterus, mucous membranes moist, throat clear, neck supple, 

nontender, trachea midline.


Lungs: Clear to auscultation, breath sounds equal bilaterally, chest nontender.


Heart: S1S2, regular, negative for clicks, rubs, or JVD.


Abdomen: Soft, nondistended, nontender. Negative for masses or 

hepatosplenomegaly. Negative for costovertebral tenderness.


Pelvis: Stable nontender.


Genitourinary: Deferred.


Rectal: Deferred.


Extremities: Atraumatic, negative for cords or calf pain. Neurovascular 

unremarkable.


Neuro: Awake, alert, oriented. Cranial nerves II through XII unremarkable. 

Cerebellum unremarkable. Motor and sensory unremarkable throughout. Exam 

nonfocal.


Diagnostics:


[CBC CMP UA lipase UA


]


Therapeutics:


[1 L normal saline bolus


Zofran 8 mg IV


Toradol 30 mg IV


Morphine 2 mg IV]





Impression: 


[ abdominal pain ]


Definitive disposition and diagnosis as appropriate pending reevaluation and 

review of above.


  ** RUQ


Pain Score (Numeric/FACES): 10





- Related Data


 Allergies











Allergy/AdvReac Type Severity Reaction Status Date / Time


 


diphenhydramine Allergy  Itching Verified 05/21/18 10:46





[From Benadryl]     











Home Meds: 


 Home Meds





ALPRAZolam [Xanax] 0.5 mg PO TID PRN 04/12/18 [History]


Albuterol [Proair HFA] 1 puff INH ASDIRECTED PRN 04/12/18 [History]


Budesonide/Formoterol Fumarate [Symbicort 160-4.5 Mcg Inhaler] 1 puff INH BID 04 /12/18 [History]


Citalopram Hydrobromide [Celexa] 40 mg PO DAILY 04/12/18 [History]











Past Medical History





- Past Health History


Medical/Surgical History: Denies Medical/Surgical History


HEENT History: Reports: None


Cardiovascular History: Reports: None


Respiratory History: Reports: Other (See Below)


Other Respiratory History: occas wheezing, has taken inhalers in the past...no 

diagnosis


Gastrointestinal History: Reports: None, Other (See Below)


Other Gastrointestinal History: chronic stomach problems and vomiting


Genitourinary History: Reports: None


OB/GYN History: Reports: None


Musculoskeletal History: Reports: None


Neurological History: Reports: None


Psychiatric History: Reports: Anxiety, Depression, Panic Attack, PTSD


Other Psychiatric History: post traumatic stress disorder, conversion disorder


Endocrine/Metabolic History: Reports: None


Hematologic History: Reports: Blood Transfusion(s)


Other Hematologic History: as a baby





- Infectious Disease History


Infectious Disease History: Reports: None





- Past Surgical History


HEENT Surgical History: Reports: Adenoidectomy, Tonsillectomy


Other HEENT Surgeries/Procedures: addenoidectomy


Female  Surgical History: Reports: None





Social & Family History





- Family History


Family Medical History: Noncontributory





- Tobacco Use


Smoking Status *Q: Never Smoker





- Caffeine Use


Caffeine Use: Reports: Coffee





- Recreational Drug Use


Recreational Drug Use: No





ED ROS GENERAL





- Review of Systems


Review Of Systems: ROS reveals no pertinent complaints other than HPI.





ED EXAM, GENERAL





- Physical Exam


Exam: See Below





Course





- Vital Signs


Last Recorded V/S: 


 Last Vital Signs











Temp  98.6 F   05/21/18 10:33


 


Pulse  70   05/21/18 10:33


 


Resp  20   05/21/18 10:33


 


BP  132/81   05/21/18 10:33


 


Pulse Ox  100   05/21/18 10:33














- Orders/Labs/Meds


Orders: 


 Active Orders 24 hr











 Category Date Time Status


 


 HCG QUALITATIVE,URINE [URCHEM] Stat Lab  05/21/18 11:05 Ordered


 


 UA W/MICROSCOPIC [URIN] Stat Lab  05/21/18 11:05 Ordered











Labs: 


 Laboratory Tests











  05/21/18 05/21/18 05/21/18 Range/Units





  10:45 10:45 11:05 


 


WBC  7.02    (4.0-11.0)  K/uL


 


RBC  4.48    (4.30-5.90)  M/uL


 


Hgb  13.6    (12.0-16.0)  g/dL


 


Hct  38.9    (36.0-46.0)  %


 


MCV  86.8    (80.0-98.0)  fL


 


MCH  30.4    (27.0-32.0)  pg


 


MCHC  35.0    (31.0-37.0)  g/dL


 


RDW Std Deviation  40.5    (28.0-62.0)  fl


 


RDW Coeff of Gali  13    (11.0-15.0)  %


 


Plt Count  372    (150-400)  K/uL


 


MPV  9.20    (7.40-12.00)  fL


 


Neut % (Auto)  63.5    (48.0-80.0)  %


 


Lymph % (Auto)  27.2    (16.0-40.0)  %


 


Mono % (Auto)  9.0    (0.0-15.0)  %


 


Eos % (Auto)  0.0    (0.0-7.0)  %


 


Baso % (Auto)  0.3    (0.0-1.5)  %


 


Neut # (Auto)  4.5    (1.4-5.7)  K/uL


 


Lymph # (Auto)  1.9    (0.6-2.4)  K/uL


 


Mono # (Auto)  0.6    (0.0-0.8)  K/uL


 


Eos # (Auto)  0.0    (0.0-0.7)  K/uL


 


Baso # (Auto)  0.0    (0.0-0.1)  K/uL


 


Nucleated RBC %  0.0    /100WBC


 


Nucleated RBCs #  0    K/uL


 


Sodium   137   (136-145)  mmol/L


 


Potassium   3.1 L   (3.5-5.1)  mmol/L


 


Chloride   103   ()  mmol/L


 


Carbon Dioxide   20.0 L   (21.0-32.0)  mmol/L


 


BUN   7   (7.0-18.0)  mg/dL


 


Creatinine   0.8   (0.6-1.0)  mg/dL


 


Est Cr Clr Drug Dosing   86.75   mL/min


 


Estimated GFR (MDRD)   > 60.0   ml/min


 


Glucose   111 H   ()  mg/dL


 


Calcium   8.9   (8.5-10.1)  mg/dL


 


Total Bilirubin   0.5   (0.2-1.0)  mg/dL


 


AST   16   (15-37)  IU/L


 


ALT   15   (14-63)  IU/L


 


Alkaline Phosphatase   46   ()  U/L


 


Total Protein   7.0   (6.4-8.2)  g/dL


 


Albumin   4.3   (3.4-5.0)  g/dL


 


Globulin   2.7   (2.0-3.5)  g/dL


 


Albumin/Globulin Ratio   1.6   (1.3-2.8)  


 


Lipase   126   ()  U/L


 


Urine Color    YELLOW  


 


Urine Appearance    CLOUDY  


 


Urine pH    8.5 H  (5.0-8.0)  


 


Ur Specific Gravity    1.020  (1.001-1.035)  


 


Urine Protein    NEGATIVE  (NEGATIVE)  mg/dL


 


Urine Glucose (UA)    NEGATIVE  (NEGATIVE)  mg/dL


 


Urine Ketones    >=80  (NEGATIVE)  mg/dL


 


Urine Occult Blood    LARGE H  (NEGATIVE)  


 


Urine Nitrite    NEGATIVE  (NEGATIVE)  


 


Urine Bilirubin    NEGATIVE  (NEGATIVE)  


 


Urine Urobilinogen    0.2  (<2.0)  EU/dL


 


Ur Leukocyte Esterase    NEGATIVE  (NEGATIVE)  


 


Urine RBC    20-30  (0-2/HPF)  


 


Urine WBC    2-4  (0-5/HPF)  


 


Ur Epithelial Cells    OCCASIONAL  (NONE-FEW)  


 


Amorphous Sediment    HEAVY  (NEGATIVE)  


 


Urine Bacteria    NOT SEEN  (NEGATIVE)  


 


Urine Mucus    NOT SEEN  (NONE-MOD)  


 


Urine HCG, Qual     (NEGATIVE)  














  05/21/18 Range/Units





  11:05 


 


WBC   (4.0-11.0)  K/uL


 


RBC   (4.30-5.90)  M/uL


 


Hgb   (12.0-16.0)  g/dL


 


Hct   (36.0-46.0)  %


 


MCV   (80.0-98.0)  fL


 


MCH   (27.0-32.0)  pg


 


MCHC   (31.0-37.0)  g/dL


 


RDW Std Deviation   (28.0-62.0)  fl


 


RDW Coeff of Gali   (11.0-15.0)  %


 


Plt Count   (150-400)  K/uL


 


MPV   (7.40-12.00)  fL


 


Neut % (Auto)   (48.0-80.0)  %


 


Lymph % (Auto)   (16.0-40.0)  %


 


Mono % (Auto)   (0.0-15.0)  %


 


Eos % (Auto)   (0.0-7.0)  %


 


Baso % (Auto)   (0.0-1.5)  %


 


Neut # (Auto)   (1.4-5.7)  K/uL


 


Lymph # (Auto)   (0.6-2.4)  K/uL


 


Mono # (Auto)   (0.0-0.8)  K/uL


 


Eos # (Auto)   (0.0-0.7)  K/uL


 


Baso # (Auto)   (0.0-0.1)  K/uL


 


Nucleated RBC %   /100WBC


 


Nucleated RBCs #   K/uL


 


Sodium   (136-145)  mmol/L


 


Potassium   (3.5-5.1)  mmol/L


 


Chloride   ()  mmol/L


 


Carbon Dioxide   (21.0-32.0)  mmol/L


 


BUN   (7.0-18.0)  mg/dL


 


Creatinine   (0.6-1.0)  mg/dL


 


Est Cr Clr Drug Dosing   mL/min


 


Estimated GFR (MDRD)   ml/min


 


Glucose   ()  mg/dL


 


Calcium   (8.5-10.1)  mg/dL


 


Total Bilirubin   (0.2-1.0)  mg/dL


 


AST   (15-37)  IU/L


 


ALT   (14-63)  IU/L


 


Alkaline Phosphatase   ()  U/L


 


Total Protein   (6.4-8.2)  g/dL


 


Albumin   (3.4-5.0)  g/dL


 


Globulin   (2.0-3.5)  g/dL


 


Albumin/Globulin Ratio   (1.3-2.8)  


 


Lipase   ()  U/L


 


Urine Color   


 


Urine Appearance   


 


Urine pH   (5.0-8.0)  


 


Ur Specific Gravity   (1.001-1.035)  


 


Urine Protein   (NEGATIVE)  mg/dL


 


Urine Glucose (UA)   (NEGATIVE)  mg/dL


 


Urine Ketones   (NEGATIVE)  mg/dL


 


Urine Occult Blood   (NEGATIVE)  


 


Urine Nitrite   (NEGATIVE)  


 


Urine Bilirubin   (NEGATIVE)  


 


Urine Urobilinogen   (<2.0)  EU/dL


 


Ur Leukocyte Esterase   (NEGATIVE)  


 


Urine RBC   (0-2/HPF)  


 


Urine WBC   (0-5/HPF)  


 


Ur Epithelial Cells   (NONE-FEW)  


 


Amorphous Sediment   (NEGATIVE)  


 


Urine Bacteria   (NEGATIVE)  


 


Urine Mucus   (NONE-MOD)  


 


Urine HCG, Qual  NEGATIVE  (NEGATIVE)  











Meds: 


Medications














Discontinued Medications














Generic Name Dose Route Start Last Admin





  Trade Name Freq  PRN Reason Stop Dose Admin


 


Sodium Chloride  1,000 mls @ 999 mls/hr  05/21/18 10:57  05/21/18 11:05





  Normal Saline  IV  05/21/18 11:57  999 mls/hr





  STAT ONE   Administration





     





     





     





     


 


Ketorolac Tromethamine  30 mg  05/21/18 10:57  05/21/18 11:04





  Toradol  IVPUSH  05/21/18 10:58  30 mg





  ONETIME ONE   Administration





     





     





     





     


 


Morphine Sulfate  2 mg  05/21/18 11:31  05/21/18 11:58





  Morphine  IVPUSH  05/21/18 11:32  2 mg





  ONETIME ONE   Administration





     





     





     





     


 


Ondansetron HCl  8 mg  05/21/18 10:57  05/21/18 11:04





  Zofran  IVPUSH  05/21/18 10:58  8 mg





  ONETIME ONE   Administration





     





     





     





     














Departure





- Departure


Time of Disposition: 12:31


Disposition: Home, Self-Care 01


Condition: Good


Clinical Impression: 


 Abdominal pain








- Discharge Information


Referrals: 


PCP,Unknown [Primary Care Provider] - 


Forms:  ED Department Discharge


Additional Instructions: 


Take medication as prescribed has provided by [facility]


Follow-up with Dr. De La Garza general surgery in mind that as scheduled tomorrow


Blaine diet in the interim





The following information is given to patients seen in the emergency department 

who are being discharged to home. This information is to outline your options 

for follow-up care. We provide all patients seen in our emergency department 

with a follow-up referral.





The need for follow-up, as well as the timing and circumstances, are variable 

depending upon the specifics of your emergency department visit.





If you don't have a primary care physician on staff, we will provide you with a 

referral. We always advise you to contact your personal physician following an 

emergency department visit to inform them of the circumstance of the visit and 

for follow-up with them and/or the need for any referrals to a consulting 

specialist.





The emergency department will also refer you to a specialist when appropriate. 

This referral assures that you have the opportunity for follow-up care with a 

specialist. All of these measure are taken in an effort to provide you with 

optimal care, which includes your follow-up.





Under all circumstances we always encourage you to contact your private 

physician who remains a resource for coordinating your care. When calling for 

follow-up care, please make the office aware that this follow-up is from your 

recent emergency room visit. If for any reason you are refused follow-up, 

please contact the [facility] emergency department at [phone] 

and asked to speak to the emergency department charge nurse.








- My Orders


Last 24 Hours: 


My Active Orders





05/21/18 11:05


HCG QUALITATIVE,URINE [URCHEM] Stat 


UA W/MICROSCOPIC [URIN] Stat 














- Assessment/Plan


Last 24 Hours: 


My Active Orders





05/21/18 11:05


HCG QUALITATIVE,URINE [URCHEM] Stat 


UA W/MICROSCOPIC [URIN] Stat

## 2021-09-19 NOTE — EDM.PDOC
ED HPI GENERAL MEDICAL PROBLEM





- General


Chief Complaint: Respiratory Problem


Stated Complaint: SOB ASTHMA


Time Seen by Provider: 09/19/21 02:25





- History of Present Illness


INITIAL COMMENTS - FREE TEXT/NARRATIVE: 





Patient presents complaining of shortness of breath for 1 to 2 days.  Patient 

states she ran out of her inhaler.  She has a lot of cough but no fever or 

productive sputum.  Patient not vaccinated against Covid.  No history of blood 

clot. No unilateral leg swelling. No recent travel, injury, cancer, surgery. No 

smoking or birth contro. No exacerbating relieving factors or sick contacts





- Related Data


                                    Allergies











Allergy/AdvReac Type Severity Reaction Status Date / Time


 


diphenhydramine Allergy  Itching Verified 05/21/18 10:46





[From Benadryl]     











Home Meds: 


                                    Home Meds





Albuterol Sulfate [Albuterol Sulfate HFA] 8.5 gm INH Q4HR #1 ea 09/19/21 [Rx]


Albuterol [Ventolin HFA] 2 puff INH ASDIRECTED PRN 09/19/21 [History]


Budesonide/Formoterol Fumarate [Symbicort 160-4.5 Mcg Inhaler] 2 inh INH DAILY 

09/19/21 [History]


Pnv,Calcium 72/Iron/Folic Acid [Prenatal Vitamin Plus Low Iron] 1 tab PO DAILY 

09/19/21 [History]


predniSONE [Prednisone] 50 mg PO DAILY #4 tablet 09/19/21 [Rx]











Past Medical History





- Past Health History


Medical/Surgical History: Denies Medical/Surgical History


HEENT History: Reports: None


Cardiovascular History: Reports: None


Respiratory History: Reports: Asthma, Other (See Below)


Other Respiratory History: occas wheezing, has taken inhalers in the past...no 

diagnosis


Gastrointestinal History: Reports: Other (See Below)


Other Gastrointestinal History: chronic stomach problems and vomiting


Genitourinary History: Reports: None


OB/GYN History: Reports: None


Musculoskeletal History: Reports: None


Neurological History: Reports: None


Psychiatric History: Reports: Anxiety, Depression, Panic Attack, PTSD


Other Psychiatric History: post traumatic stress disorder, conversion disorder


Endocrine/Metabolic History: Reports: None


Hematologic History: Reports: Blood Transfusion(s)


Other Hematologic History: as a baby





- Infectious Disease History


Infectious Disease History: Reports: None





- Past Surgical History


HEENT Surgical History: Reports: Adenoidectomy, Tonsillectomy


Other HEENT Surgeries/Procedures: addenoidectomy


GI Surgical History: Reports: Appendectomy, Cholecystectomy


Female  Surgical History: Reports: None





Social & Family History





- Family History


Family Medical History: No Pertinent Family History





- Tobacco Use


Tobacco Use Status *Q: Never Tobacco User


Second Hand Smoke Exposure: No





- Caffeine Use


Caffeine Use: Reports: Coffee





- Recreational Drug Use


Recreational Drug Use: No





ED ROS GENERAL





- Review of Systems


Review Of Systems: See Below


Constitutional: Denies: Fever


Respiratory: Reports: Shortness of Breath


Cardiovascular: Denies: Chest Pain


Skin: Denies: Rash





ED EXAM, GENERAL





- Physical Exam


Exam: See Below


Free Text/Narrative:: 





CONSTITUTIONAL: well appearing in no acute distress


SKIN: dry, and intact without rash


HENT: Normocephalic, atraumatic,


NECK: normal range of motion


PULMONARY: Lateral scant wheeze with good air movement


NEUROLOGIC: normal speech, moves all extremities, grossly non-focal


MUSCULOSKELETAL: no gross deformities, atraumatic


PSYCHIATRIC: normal mood and affect





Course





- Vital Signs


Text/Narrative:: 





Differential diagnosis: Asthma exacerbation, PE, pneumothorax, pneumonia, other





Patient presents as outlined above.  Patient doing better after breathing 

treatments and steroids.  Covid negative.  Supportive treat with return 

precautions and PCP follow-up.


Last Recorded V/S: 


                                Last Vital Signs











Temp  36.2 C   09/19/21 02:19


 


Pulse  97   09/19/21 04:03


 


Resp  18   09/19/21 04:03


 


BP  138/84   09/19/21 04:03


 


Pulse Ox  98   09/19/21 04:03














- Orders/Labs/Meds


Orders: 


                               Active Orders 24 hr











 Category Date Time Status


 


 RT Aerosol Therapy [RC] ASDIRECTED Care  09/19/21 02:23 Active











Labs: 


                                Laboratory Tests











  09/19/21 Range/Units





  02:29 


 


SARS-CoV-2 RNA (JENNIFER)  NEGATIVE  (NEGATIVE)  











Meds: 


Medications














Discontinued Medications














Generic Name Dose Route Start Last Admin





  Trade Name Freq  PRN Reason Stop Dose Admin


 


Albuterol/Ipratropium  3 ml  09/19/21 02:22  09/19/21 02:27





  Albuterol/Ipratropium 3.0-0.5 Mg/3 Ml Neb Soln  NEB  09/19/21 02:23  3 ml





  ONETIME ONE   Administration


 


Prednisone  60 mg  09/19/21 02:23  09/19/21 02:26





  Prednisone 20 Mg Tab  PO  09/19/21 02:24  60 mg





  ONETIME ONE   Administration














Departure





- Departure


Time of Disposition: 03:58


Disposition: Home, Self-Care 01


Condition: Good


Clinical Impression: 


 Acute asthma








- Discharge Information


Prescriptions: 


Albuterol Sulfate [Albuterol Sulfate HFA] 8.5 gm INH Q4HR #1 ea


predniSONE [Prednisone] 50 mg PO DAILY #4 tablet


Instructions:  Asthma, Adult


Forms:  ED Department Discharge


Additional Instructions: 


Return for shortness of breath, change or worsening condition or lack of 

improvement.  Follow-up with primary care doctor this coming week.  Use inhaler 

4 times a day for the next 2 days and then as needed.  Take steroids as prescr

ibed.





Sepsis Event Note (ED)





- Focused Exam


Vital Signs: 


                                   Vital Signs











  Temp Pulse Resp BP Pulse Ox


 


 09/19/21 04:03   97  18  138/84  98


 


 09/19/21 02:19  36.2 C  101 H  20  141/81 H  96














- My Orders


Last 24 Hours: 


My Active Orders





09/19/21 02:23


RT Aerosol Therapy [RC] ASDIRECTED 














- Assessment/Plan


Last 24 Hours: 


My Active Orders





09/19/21 02:23


RT Aerosol Therapy [RC] ASDIRECTED

## 2021-10-09 NOTE — EDM.PDOC
ED HPI GENERAL MEDICAL PROBLEM





- General


Chief Complaint: Neuro Symptoms/Deficits


Stated Complaint: FACIAL TWITCH NUMBNESS ON RT SIDE


Time Seen by Provider: 10/08/21 22:37


Source of Information: Reports: Patient


History Limitations: Reports: No Limitations





- History of Present Illness


INITIAL COMMENTS - FREE TEXT/NARRATIVE: 





26-year-old female with history of anxiety and panic attacks presents with 

twitching to the left face around noon.  Symptoms have resolved now.  She also 

notes twitching and numbness to the left hand and numbness to the right 

hamstring yesterday.  She is G2, , GA 15 weeks.  She admitted to vomiting 2

days ago about 6 episodes.  She currently denies fever, chills, abdominal pain, 

dysuria, vaginal bleeding.  Her OB/GYN is Dr. Bertrand at [facility].





ROS: A 10-point review of systems, other than pertinent positives and negatives 

as stated per HPI, is otherwise negative





Past medical history: No additional pertinent history


Past Surgical history: No additional pertinent history


Social history: No additional pertinent history


Family history: No additional pertinent history


________________________________________________________________________________





PHYSICAL EXAM





General: AOx4, GCS = 15, No distress


HEENT: dry mucous membrane


Neck: supple, no meningismus, no Kernig or Brudzinski


Cardiac: S1S2 RRR


Respiratory: CTAB, no crackles or rales, no wheezing


Abdomen: Soft, nontender, no rebound or guarding, nondistended, no pulsatile 

mass.


Back: nontender


Musculoskeletal: NVI distally, no deformity


Neuro: No focal deficits, CN 2 - 12 WNL.  NIHSS = 0











- Related Data


                                    Allergies











Allergy/AdvReac Type Severity Reaction Status Date / Time


 


diphenhydramine Allergy  Itching Verified 18 10:46





[From Benadryl]     











Home Meds: 


                                    Home Meds





Albuterol Sulfate [Albuterol Sulfate HFA] 8.5 gm INH Q4HR #1 ea 21 [Rx]


Albuterol [Ventolin HFA] 2 puff INH ASDIRECTED PRN 21 [History]


Budesonide/Formoterol Fumarate [Symbicort 160-4.5 Mcg Inhaler] 2 inh INH DAILY 

21 [History]


Pnv,Calcium 72/Iron/Folic Acid [Prenatal Vitamin Plus Low Iron] 1 tab PO DAILY 

21 [History]


predniSONE [Prednisone] 50 mg PO DAILY #4 tablet 21 [Rx]











Past Medical History





- Past Health History


Medical/Surgical History: Denies Medical/Surgical History


HEENT History: Reports: None


Cardiovascular History: Reports: None


Respiratory History: Reports: Asthma, Other (See Below)


Other Respiratory History: occas wheezing, has taken inhalers in the past...no 

diagnosis


Gastrointestinal History: Reports: Other (See Below)


Other Gastrointestinal History: chronic stomach problems and vomiting


Genitourinary History: Reports: None


OB/GYN History: Reports: None


Musculoskeletal History: Reports: None


Neurological History: Reports: None


Psychiatric History: Reports: Anxiety, Depression, Panic Attack, PTSD


Other Psychiatric History: post traumatic stress disorder, conversion disorder


Endocrine/Metabolic History: Reports: None


Hematologic History: Reports: Blood Transfusion(s)


Other Hematologic History: as a baby





- Infectious Disease History


Infectious Disease History: Reports: None





- Past Surgical History


HEENT Surgical History: Reports: Adenoidectomy, Tonsillectomy


Other HEENT Surgeries/Procedures: addenoidectomy


GI Surgical History: Reports: Appendectomy, Cholecystectomy


Female  Surgical History: Reports: None





Social & Family History





- Family History


Family Medical History: No Pertinent Family History





- Tobacco Use


Tobacco Use Status *Q: Never Tobacco User





- Caffeine Use


Caffeine Use: Reports: None





- Recreational Drug Use


Recreational Drug Use: No





ED ROS GENERAL





- Review of Systems


Review Of Systems: See Below (see dictation)





ED EXAM, GENERAL





- Physical Exam


Exam: See Below (see dictation)





Course





- Vital Signs


Last Recorded V/S: 





                                Last Vital Signs











Temp  98.2 F   10/08/21 22:14


 


Pulse  86   10/08/21 22:14


 


Resp  17   10/08/21 22:14


 


BP  118/94 H  10/08/21 22:14


 


Pulse Ox  98   10/08/21 22:14














- Orders/Labs/Meds


Orders: 





                               Active Orders 24 hr











 Category Date Time Status


 


 Fetal Heart Tones [RC] ASDIRECTED Care  10/08/21 23:39 Active











Labs: 





                                Laboratory Tests











  10/08/21 10/08/21 Range/Units





  23:15 23:15 


 


WBC  9.43   (4.0-11.0)  K/uL


 


RBC  4.17 L   (4.30-5.90)  M/uL


 


Hgb  13.0   (12.0-16.0)  g/dL


 


Hct  36.0   (36.0-46.0)  %


 


MCV  86.3   (80.0-98.0)  fL


 


MCH  31.2   (27.0-32.0)  pg


 


MCHC  36.1   (31.0-37.0)  g/dL


 


RDW Std Deviation  39.1   (28.0-62.0)  fl


 


RDW Coeff of Gali  13   (11.0-15.0)  %


 


Plt Count  252   (150-400)  K/uL


 


MPV  9.40   (7.40-12.00)  fL


 


Neut % (Auto)  65.1   (48.0-80.0)  %


 


Lymph % (Auto)  27.8   (16.0-40.0)  %


 


Mono % (Auto)  4.8   (0.0-15.0)  %


 


Eos % (Auto)  2.2   (0.0-7.0)  %


 


Baso % (Auto)  0.1   (0.0-1.5)  %


 


Neut # (Auto)  6.1 H   (1.4-5.7)  K/uL


 


Lymph # (Auto)  2.6 H   (0.6-2.4)  K/uL


 


Mono # (Auto)  0.5   (0.0-0.8)  K/uL


 


Eos # (Auto)  0.2   (0.0-0.7)  K/uL


 


Baso # (Auto)  0.0   (0.0-0.1)  K/uL


 


Nucleated RBC %  0.0   /100WBC


 


Nucleated RBCs #  0   K/uL


 


Sodium   137  (136-145)  mmol/L


 


Potassium   3.6  (3.5-5.1)  mmol/L


 


Chloride   103  ()  mmol/L


 


Carbon Dioxide   19.4 L  (21.0-32.0)  mmol/L


 


BUN   6 L  (7.0-18.0)  mg/dL


 


Creatinine   0.5 L  (0.6-1.0)  mg/dL


 


Est Cr Clr Drug Dosing   148.53  mL/min


 


Estimated GFR (MDRD)   > 60.0  ml/min


 


Glucose   79  ()  mg/dL


 


Calcium   8.3 L  (8.5-10.1)  mg/dL


 


Phosphorus   4.0  (2.6-4.7)  mg/dL


 


Magnesium   1.7 L  (1.8-2.4)  mg/dL


 


Total Bilirubin   0.3  (0.2-1.0)  mg/dL


 


AST   19  (15-37)  IU/L


 


ALT   12 L  (14-63)  IU/L


 


Alkaline Phosphatase   56  ()  U/L


 


Total Protein   6.6  (6.4-8.2)  g/dL


 


Albumin   3.5  (3.4-5.0)  g/dL


 


Globulin   3.1  (2.6-4.0)  g/dL


 


Albumin/Globulin Ratio   1.1  (0.9-1.6)  











Meds: 





Medications














Discontinued Medications














Generic Name Dose Route Start Last Admin





  Trade Name Freq  PRN Reason Stop Dose Admin


 


Calcium Carbonate/Glycine  1,000 mg  10/09/21 01:03 





  Calcium Carbonate 500 Mg Tab.Chew  PO  10/09/21 01:04 





  ONETIME ONE  


 


Magnesium Oxide  400 mg  10/09/21 01:03 





  Magnesium Oxide 400 Mg Tab  PO  10/09/21 01:04 





  ONETIME ONE  














- Re-Assessments/Exams


Free Text/Narrative Re-Assessment/Exam: 





10/09/21 01:07


After electrolyte repletion in the ER, the patient improved and is currently 

stable for discharge. I performed a repeat exam and did not appreciate new 

abnormal findings. Patient exhibits normal vital signs and has a normal gait on 

road test. I advised the patient to return to the ER for reevaluation if 

symptoms worsened, including fever, worsening pain, or any other worrisome 

symptoms. I instructed the patient to follow up with Dr. Hay bertrand.





________________________________________________________________________________





MEDICAL DECISION MAKING: I reviewed the patients past medical records, lab and 

radiographic findings. I discussed the case with the patient. My differential 

diagnosis included: Electrolyte abnormality.  Patient was found to be 

hypocalcemic and hypomagnesia, repleted orally in the ED.  Fetal heart tones 

unremarkable.





Departure





- Departure


Time of Disposition: 01:07


Disposition: Home, Self-Care 01


Condition: Good


Clinical Impression: 


 Hypomagnesemia with secondary hypocalcemia, Numbness








- Discharge Information


*PRESCRIPTION DRUG MONITORING PROGRAM REVIEWED*: Not Applicable


*COPY OF PRESCRIPTION DRUG MONITORING REPORT IN PATIENT CARLOS: Not Applicable


Instructions:  Hypomagnesemia, Hypocalcemia, Adult


Referrals: 


Miller Hinojosa MD [Primary Care Provider] - 3 Days


Hay Bertrand MD [Physician] - 3 Days


Forms:  ED Department Discharge


Additional Instructions: 


The need for follow-up, as well as the timing and circumstances, are variable 

depending upon the specifics of your emergency department visit.





If you don't have a primary care physician on staff, we will provide you with a 

referral. We always advise you to contact your personal physician following an 

emergency department visit to inform them of the circumstance of the visit and 

for follow-up with them and/or the need for any referrals to a consulting 

specialist.





The emergency department will also refer you to a specialist when appropriate. 

This referral assures that you have the opportunity for follow-up care with a 

specialist. All of these measure are taken in an effort to provide you with 

optimal care, which includes your follow-up.





Under all circumstances we always encourage you to contact your private 

physician who remains a resource for coordinating your care. When calling for 

follow-up care, please make the office aware that this follow-up is from your 

recent emergency room visit. If for any reason you are refused follow-up, please

 contact the [facility] Emergency 

Department at [phone] and asked to speak to the emergency department 

charge nurse.





If you do not have a primary care doctor, please follow up with the clinics 

below within 3-5 days. 





[facility]


[address] [place] [address]


Phone: [phone]


Fax: [phone]





[facility]


[address] [place] [address]


Phone: [phone]


Fax: [phone]








Sepsis Event Note (ED)





- Focused Exam


Vital Signs: 





                                   Vital Signs











  Temp Pulse Resp BP Pulse Ox


 


 10/08/21 22:14  98.2 F  86  17  118/94 H  98














- My Orders


Last 24 Hours: 





My Active Orders





10/08/21 23:39


Fetal Heart Tones [RC] ASDIRECTED 














- Assessment/Plan


Last 24 Hours: 





My Active Orders





10/08/21 23:39


Fetal Heart Tones [RC] ASDIRECTED

## 2021-11-11 NOTE — US
Indication:



Cramping. Prior history of miscarriage.



Technique:



Sonography of the gravid uterus was performed limited to the aspects 

discussed below.



Comparison:



There are no prior studies for comparison



Findings:



There is a single living intrauterine gestation that is currently breech. 

The placenta is posterior. There is no evidence of previa and there is no 

evidence of abruption. The placenta is grade 2. The single deepest pocket 

is 4.3 centimeters. The total fluid volume is 15.3 centimeters. The 

cervical length is 3 centimeters. Fetal heart rate is 150 beats per minute 

which is normal. 



All caps eyes 



BPD is 4.7 centimeters corresponding to 20 weeks and 2 days 



HC is 18.1 centimeters corresponding to 20 weeks and 4 days 



AC is 14.52 centimeters corresponding to 19 weeks and 6 days 



Femur length is 3.26 centimeters corresponding to 20 weeks and 2 days 



Estimated fetal weight is 329 grams. This is at the 48th percentile. 



The average ultrasound age is currently 20 weeks and 2 days yielding an 

estimated date of delivery of 03/29/2022. By the LMP, the gestational age 

is 20 weeks and 0 days with a clinically estimated date of delivery of 

03/31/2022. The findings are concordant. 



Anatomy was performed primarily for biometry though no anatomic 

abnormalities were visible during this exam. A nose lips view could not be 

documented due to fetal position. 



Impression:



1. There is a single live intrauterine gestation that is currently breech. 

Posterior placenta. No previa. No abruption. Normal fetal heart rate. 

Normal fluid volumes. 



2. Biometry yields 20 weeks and 2 days with an estimated date of delivery 

of 03/29/2022. The current ultrasound dating is concordant with the age 

predicted by the LMP. 



3. Anatomic survey was unremarkable as described.



Dictated by Delmar Porter MD @ 11/11/2021 5:41:04 PM



(Electronically Signed)

## 2021-11-11 NOTE — EDM.PDOC
ED HPI GENERAL MEDICAL PROBLEM





- General


Stated Complaint: CRAMPING/ 20 WEEKS PREGNANT


Time Seen by Provider: 21 15:49


Source of Information: Reports: Patient


History Limitations: Reports: No Limitations





- History of Present Illness


INITIAL COMMENTS - FREE TEXT/NARRATIVE: 


HISTORY AND PHYSICAL:





History of present illness:


Patient is a 26-year-old female who presents to the emergency room with comp

laints of abdominal cramping over the past 2 to 3 days during pregnancy.  

Patient reports she has little over 19 weeks gestation,  2, para 0.  She 

describes the cramping as low abdominal cramping bilaterally and does have some 

epigastric pain.  She has also noted some low back pain without any dysuria or 

vaginal bleeding.  She denies any recent injury, trauma or pelvic activity.  She

does see [facility] Ar[facility]aga [facility] has had uneventful prenatal 

care thus far. Patient denies any fever, chills, headache, change in vision, 

syncope or near syncope. Denies any chest pain, back pain, shortness of breath 

or cough. Denies any abdominal pain, nausea, vomiting, diarrhea, constipation or

dysuria. Has not noted any blood in urine or stool. Patient has been eating and 

drinking appropriately. No recent travel or sick contacts.





Review of systems: 


As per history of present illness and below otherwise all systems reviewed and 

negative.





Past medical history: 


As per history of present illness and as reviewed below otherwise 

noncontributory.





Surgical history: 


As per history of present illness and as reviewed below otherwise 

noncontributory.





Social history: 


See social history for further information





Family history: 


As per history of present illness and as reviewed below otherwise 

noncontributory.





Physical exam:


General: Well developed and well nourished. Alert and orientated x 3. Nontoxic 

in appearance and in no acute distress. Vital signs are stable and have been 

reviewed by me. Nursing notes were reviewed. 


HEENT: Atraumatic, normocephalic, pupils equal and reactive bilaterally, 

negative for conjunctival pallor or scleral icterus, mucous membranes moist, TMs

normal bilaterally, throat clear, neck supple, nontender, trachea midline. No 

drooling or trismus noted. No meningeal signs. No hot potato voice noted. 


Lungs: Clear to auscultation bilaterally. No wheezes, rales, or rhonchi. Chest 

nontender. Normal work of breathing, no accessory muscles used.


Heart: S1S2, regular rate and rhythm without overt murmur, gallops, or rubs. No 

JVD. No peripheral edema


Abdomen: Soft, nondistended, nontender. Normoactive bowel sounds. Negative for 

masses or costovertebral tenderness.


Pelvis: Stable nontender.


Genitourinary/Rectal: Deferred.


Skin: Intact, warm, dry. No lesions or rashes noted.


Hematologic: No petechiae or purpra. Mucosa appropriate color and normal nail 

bed color and refill.


Extremities: Atraumatic, moves all extremities per self without difficulty or 

deficits, negative for cords or calf pain. Neurovascular unremarkable.


Neuro: Awake, alert, oriented. Cranial nerves II through XII unremarkable. 

Cerebellum unremarkable. Motor and sensory unremarkable throughout. Exam 

nonfocal.


Psychiatric: Mood and affect are appropriate.  Normal thought process. Answering

questions appropriately.





Please note that the patient was seen and evaluated during the  SARS-CoV-2 

novel coronavirus pandemic period.  Community viral transmission is ongoing at 

time of this encounter and the emergency department is operating under pandemic 

response procedures.





Medical Decision Making:


Today's lab work shows no acute or concerning findings. As mentioned previous 

she has no vaginal bleeding or discharge. She has been attending her prenatal 

appointments has not had any complications thus far. Ultrasound shows a single 

live intrauterine gestation that is currently breech. Posterior placenta. No 

previa. No abruption. Normal fetal heart rate. Normal fluid volumes. Biometry 

yields 20 weeks and 2 days with an estimated date of delivery of 2022. The

current ultrasound dating is concordant with the age predicted by the LMP. 

Anatomic survey was unremarkable as described.





I have talked with the patient about today's findings, in addition to providing 

specific details for plan of care.  Reassessment at the time of disposition 

demonstrates that the patient is in no acute distress.  The patient is stable 

for discharge, counseling was provided and we discussed in great detail signs 

and symptoms that would prompt them to return to the Emergency Department. 

Medication, follow up and supportive care measures were reviewed and discussed. 

Voices understanding and is agreeable to plan of care. Denies any further 

questions or concerns at this time.





**Upon discharge OB will do fetal monitoring since she is 20+ weeks





Diagnostics:


CBC, CMP, ABH, UA, OB u/s





Therapeutics:


IV fluids





Prescription:


None





Impression: 


Pelvic pain in pregnancy





Plan:


1.  You were evaluated today on an emergent basis. Your ultrasound is normal 

with normal heart rate and fluid volumes. Symptoms maybe related to round 

ligament pain. 


2.  Gentle stretching and heat of hip flexors/pelvis. You can take Tylenol as 

needed for pain and fever management.


3. We encourage you to follow up with your OBGYN in the next few days for re-

evaluation and further care/management. 


4. If your symptoms should worsen, new symptoms develop or any of the signs and 

symptoms we discussed should arise please return to the emergency room or call 

911 (if needed).





Definitive disposition and diagnosis as appropriate pending reevaluation and 

review of above.





  ** lower abdomen


Pain Score (Numeric/FACES): 5





- Related Data


                                    Allergies











Allergy/AdvReac Type Severity Reaction Status Date / Time


 


diphenhydramine Allergy  Itching Verified 21 16:05





[From Benadryl]     











Home Meds: 


                                    Home Meds





Albuterol Sulfate [Albuterol Sulfate HFA] 8.5 gm INH Q4HR #1 ea 21 [Rx]


Budesonide/Formoterol Fumarate [Symbicort 160-4.5 Mcg Inhaler] 2 inh INH DAILY 

21 [History]


Pnv,Calcium 72/Iron/Folic Acid [Prenatal Vitamin Plus Low Iron] 1 tab PO DAILY 

21 [History]











Past Medical History





- Past Health History


Medical/Surgical History: Denies Medical/Surgical History


HEENT History: Reports: None


Cardiovascular History: Reports: None


Respiratory History: Reports: Asthma, Other (See Below)


Other Respiratory History: occas wheezing, has taken inhalers in the past...no 

diagnosis


Gastrointestinal History: Reports: Other (See Below)


Other Gastrointestinal History: chronic stomach problems and vomiting


Genitourinary History: Reports: None


OB/GYN History: Reports: None


Musculoskeletal History: Reports: None


Neurological History: Reports: None


Psychiatric History: Reports: Anxiety, Depression, Panic Attack, PTSD


Other Psychiatric History: post traumatic stress disorder, conversion disorder


Endocrine/Metabolic History: Reports: None


Hematologic History: Reports: Blood Transfusion(s)


Other Hematologic History: as a baby





- Infectious Disease History


Infectious Disease History: Reports: None





- Past Surgical History


HEENT Surgical History: Reports: Adenoidectomy, Tonsillectomy


Other HEENT Surgeries/Procedures: addenoidectomy


GI Surgical History: Reports: Appendectomy, Cholecystectomy


Female  Surgical History: Reports: None





Social & Family History





- Family History


Family Medical History: No Pertinent Family History





- Caffeine Use


Caffeine Use: Reports: None





ED ROS GENERAL





- Review of Systems


Review Of Systems: Comprehensive ROS is negative, except as noted in HPI.





ED EXAM PREGNANCY





- Physical Exam


Exam: See Below (See dictation)





Course





- Vital Signs


Last Recorded V/S: 


                                Last Vital Signs











Temp  96.8 F L  21 16:06


 


Pulse  87   21 17:22


 


Resp  18   21 17:22


 


BP  111/67   21 17:22


 


Pulse Ox  98   21 17:22














- Orders/Labs/Meds


Labs: 


                                Laboratory Tests











  21 Range/Units





  16:18 16:59 16:59 


 


WBC    9.72  (4.0-11.0)  K/uL


 


RBC    4.02 L  (4.30-5.90)  M/uL


 


Hgb    12.8  (12.0-16.0)  g/dL


 


Hct    36.2  (36.0-46.0)  %


 


MCV    90.0  (80.0-98.0)  fL


 


MCH    31.8  (27.0-32.0)  pg


 


MCHC    35.4  (31.0-37.0)  g/dL


 


RDW Std Deviation    42.8  (28.0-62.0)  fl


 


RDW Coeff of Gali    13  (11.0-15.0)  %


 


Plt Count    250  (150-400)  K/uL


 


MPV    9.40  (7.40-12.00)  fL


 


Neut % (Auto)    76.0  (48.0-80.0)  %


 


Lymph % (Auto)    18.4  (16.0-40.0)  %


 


Mono % (Auto)    4.1  (0.0-15.0)  %


 


Eos % (Auto)    1.3  (0.0-7.0)  %


 


Baso % (Auto)    0.2  (0.0-1.5)  %


 


Neut # (Auto)    7.4 H  (1.4-5.7)  K/uL


 


Lymph # (Auto)    1.8  (0.6-2.4)  K/uL


 


Mono # (Auto)    0.4  (0.0-0.8)  K/uL


 


Eos # (Auto)    0.1  (0.0-0.7)  K/uL


 


Baso # (Auto)    0.0  (0.0-0.1)  K/uL


 


Nucleated RBC %    0.0  /100WBC


 


Nucleated RBCs #    0  K/uL


 


Sodium   139   (136-145)  mmol/L


 


Potassium   3.5   (3.5-5.1)  mmol/L


 


Chloride   103   ()  mmol/L


 


Carbon Dioxide   22.9   (21.0-32.0)  mmol/L


 


BUN   7   (7.0-18.0)  mg/dL


 


Creatinine   0.6   (0.6-1.0)  mg/dL


 


Est Cr Clr Drug Dosing   123.77   mL/min


 


Estimated GFR (MDRD)   > 60.0   ml/min


 


Glucose   81   ()  mg/dL


 


Calcium   8.7   (8.5-10.1)  mg/dL


 


Total Bilirubin   0.3   (0.2-1.0)  mg/dL


 


AST   14 L   (15-37)  IU/L


 


ALT   18   (14-63)  IU/L


 


Alkaline Phosphatase   49   ()  U/L


 


Total Protein   6.5   (6.4-8.2)  g/dL


 


Albumin   3.0 L   (3.4-5.0)  g/dL


 


Globulin   3.5   (2.6-4.0)  g/dL


 


Albumin/Globulin Ratio   0.9   (0.9-1.6)  


 


Urine Color  YELLOW    


 


Urine Appearance  CLEAR    


 


Urine pH  6.0    (5.0-8.0)  


 


Ur Specific Gravity  1.025    (1.001-1.035)  


 


Urine Protein  NEGATIVE    (NEGATIVE)  mg/dL


 


Urine Glucose (UA)  NEGATIVE    (NEGATIVE)  mg/dL


 


Urine Ketones  TRACE H    (NEGATIVE)  mg/dL


 


Urine Occult Blood  NEGATIVE    (NEGATIVE)  


 


Urine Nitrite  NEGATIVE    (NEGATIVE)  


 


Urine Bilirubin  NEGATIVE    (NEGATIVE)  


 


Urine Urobilinogen  0.2    (<2.0)  EU/dL


 


Ur Leukocyte Esterase  NEGATIVE    (NEGATIVE)  


 


Urine RBC  0-1    (0-2/HPF)  


 


Urine WBC  0-1    (0-5/HPF)  


 


Ur Epithelial Cells  RARE    (NONE-FEW)  


 


Urine Bacteria  FEW    (NEGATIVE)  


 


Blood Type     














  21 Range/Units





  16:59 


 


WBC   (4.0-11.0)  K/uL


 


RBC   (4.30-5.90)  M/uL


 


Hgb   (12.0-16.0)  g/dL


 


Hct   (36.0-46.0)  %


 


MCV   (80.0-98.0)  fL


 


MCH   (27.0-32.0)  pg


 


MCHC   (31.0-37.0)  g/dL


 


RDW Std Deviation   (28.0-62.0)  fl


 


RDW Coeff of Gali   (11.0-15.0)  %


 


Plt Count   (150-400)  K/uL


 


MPV   (7.40-12.00)  fL


 


Neut % (Auto)   (48.0-80.0)  %


 


Lymph % (Auto)   (16.0-40.0)  %


 


Mono % (Auto)   (0.0-15.0)  %


 


Eos % (Auto)   (0.0-7.0)  %


 


Baso % (Auto)   (0.0-1.5)  %


 


Neut # (Auto)   (1.4-5.7)  K/uL


 


Lymph # (Auto)   (0.6-2.4)  K/uL


 


Mono # (Auto)   (0.0-0.8)  K/uL


 


Eos # (Auto)   (0.0-0.7)  K/uL


 


Baso # (Auto)   (0.0-0.1)  K/uL


 


Nucleated RBC %   /100WBC


 


Nucleated RBCs #   K/uL


 


Sodium   (136-145)  mmol/L


 


Potassium   (3.5-5.1)  mmol/L


 


Chloride   ()  mmol/L


 


Carbon Dioxide   (21.0-32.0)  mmol/L


 


BUN   (7.0-18.0)  mg/dL


 


Creatinine   (0.6-1.0)  mg/dL


 


Est Cr Clr Drug Dosing   mL/min


 


Estimated GFR (MDRD)   ml/min


 


Glucose   ()  mg/dL


 


Calcium   (8.5-10.1)  mg/dL


 


Total Bilirubin   (0.2-1.0)  mg/dL


 


AST   (15-37)  IU/L


 


ALT   (14-63)  IU/L


 


Alkaline Phosphatase   ()  U/L


 


Total Protein   (6.4-8.2)  g/dL


 


Albumin   (3.4-5.0)  g/dL


 


Globulin   (2.6-4.0)  g/dL


 


Albumin/Globulin Ratio   (0.9-1.6)  


 


Urine Color   


 


Urine Appearance   


 


Urine pH   (5.0-8.0)  


 


Ur Specific Gravity   (1.001-1.035)  


 


Urine Protein   (NEGATIVE)  mg/dL


 


Urine Glucose (UA)   (NEGATIVE)  mg/dL


 


Urine Ketones   (NEGATIVE)  mg/dL


 


Urine Occult Blood   (NEGATIVE)  


 


Urine Nitrite   (NEGATIVE)  


 


Urine Bilirubin   (NEGATIVE)  


 


Urine Urobilinogen   (<2.0)  EU/dL


 


Ur Leukocyte Esterase   (NEGATIVE)  


 


Urine RBC   (0-2/HPF)  


 


Urine WBC   (0-5/HPF)  


 


Ur Epithelial Cells   (NONE-FEW)  


 


Urine Bacteria   (NEGATIVE)  


 


Blood Type  A POSITIVE  











Meds: 


Medications














Discontinued Medications














Generic Name Dose Route Start Last Admin





  Trade Name Freq  PRN Reason Stop Dose Admin


 


Acetaminophen  1,000 mg  21 17:14  21 17:21





  Acetaminophen 500 Mg Tab  PO  21 17:15  1,000 mg





  ONETIME ONE   Administration














Departure





- Departure


Time of Disposition: 18:00


Disposition: Home, Self-Care 01


Clinical Impression: 


Pelvic pain affecting pregnancy


Qualifiers:


 Trimester: second trimester Qualified Code(s): O26.892 - Other specified 

pregnancy related conditions, second trimester








- Discharge Information


Instructions:  Round Ligament Pain


Referrals: 


Hay Goss MD [Primary Care Provider] - 


Forms:  ED Department Discharge


Additional Instructions: 


The following information is given to patients seen in the emergency department 

who are being discharged to home. This information is to outline your options 

for follow-up care. We provide all patients seen in our emergency department 

with a follow-up referral.





The need for follow-up, as well as the timing and circumstances, are variable 

depending upon the specifics of your emergency department visit.





If you don't have a primary care physician on staff, we will provide you with a 

referral. We always advise you to contact your personal physician following an 

emergency department visit to inform them of the circumstance of the visit and 

for follow-up with them and/or the need for any referrals to a consulting 

specialist.





The emergency department will also refer you to a specialist when appropriate. 

This referral assures that you have the opportunity for follow-up care with a 

specialist. All of these measure are taken in an effort to provide you with 

optimal care, which includes your follow-up.





Under all circumstances we always encourage you to contact your private 

physician who remains a resource for coordinating your care. When calling for 

follow-up care, please make the office aware that this follow-up is from your 

recent emergency room visit. If for any reason you are refused follow-up, please

contact the [facility] Emergency Department

at [phone] and asked to speak to the emergency department charge nurse.





[facility]


Primary Care


[address] [place] [address]


Phone: [phone]


Fax: [phone]





[facility]


[address] [place] [id number]


Phone: [phone]


Fax: [phone]





Thank you for choosing the [facility] emergency department in 

[place] for your medical needs today.  It was a pleasure caring for you. Today

you were seen in the emergency department for abdominal/pelvic pain in 

pregnancy.





1.  You were evaluated today on an emergent basis. Your ultrasound is normal 

with normal heart rate and fluid volumes. Symptoms maybe related to round 

ligament pain. 


2.  Gentle stretching and heat of hip flexors/pelvis. You can take Tylenol as 

needed for pain and fever management.


3. We encourage you to follow up with your OBGYN in the next few days for re-

evaluation and further care/management. 


4. If your symptoms should worsen, new symptoms develop or any of the signs and 

symptoms we discussed should arise please return to the emergency room or call 

911 (if needed). 





Sepsis Event Note (ED)





- Focused Exam


Vital Signs: 


                                   Vital Signs











  Temp Pulse Resp BP Pulse Ox


 


 21 17:22   87  18  111/67  98


 


 21 16:06  96.8 F L  113 H  18  126/81  97

## 2023-05-28 NOTE — CR
EXAM DATE: 18



PATIENT'S AGE: 22



Patient: CINDY VELOZ



Facility: [place], [place]

Patient ID: [id number]

Site Patient ID: [id number].

Site Accession #: [id number].

: 1995

Study: XRay Abdomen/Pelvis [id number]/13/2018 2:02:34 AM

Ordering Physician: Darren Ovalle



Final Report: 

Indication:

Lower abdominal pain



Technique:

Two views of the abdomen. 



Comparison:

2017.



Findings/Impression: :

A nonobstructive bowel gas pattern. 

No suspicious calcifications seen. 

An IUD within the pelvis. 

No definite evidence of gross free air. 

Unremarkable osseous structures.



Dictated by Stevie Dalton MD @ 2018 2:14:51 AM



Dictated by: Stevie Dalton MD @ 2018 02:14:57

(Electronic Signature)





Report Signed by Proxy.
MTDD
Male